# Patient Record
Sex: MALE | Race: WHITE | Employment: FULL TIME | URBAN - METROPOLITAN AREA
[De-identification: names, ages, dates, MRNs, and addresses within clinical notes are randomized per-mention and may not be internally consistent; named-entity substitution may affect disease eponyms.]

---

## 2018-08-28 PROBLEM — Z79.52 LONG TERM (CURRENT) USE OF SYSTEMIC STEROIDS: Status: ACTIVE | Noted: 2018-07-16

## 2018-08-28 PROBLEM — K58.9 IBS (IRRITABLE BOWEL SYNDROME): Status: ACTIVE | Noted: 2018-08-28

## 2018-09-10 PROBLEM — E29.1 HYPOGONADISM IN MALE: Status: ACTIVE | Noted: 2018-09-10

## 2021-03-29 ENCOUNTER — HOSPITAL ENCOUNTER (OUTPATIENT)
Dept: LAB | Age: 51
Discharge: HOME | End: 2021-03-29
Payer: OTHER GOVERNMENT

## 2021-03-29 ENCOUNTER — HOSPITAL ENCOUNTER (OUTPATIENT)
Dept: NON INVASIVE DIAGNOSTICS | Age: 51
Discharge: HOME | End: 2021-03-29
Payer: OTHER GOVERNMENT

## 2021-03-29 ENCOUNTER — TRANSCRIBE ORDER (OUTPATIENT)
Dept: REGISTRATION | Age: 51
End: 2021-03-29

## 2021-03-29 DIAGNOSIS — M23.203 DEGENERATIVE TEAR OF MEDIAL MENISCUS OF RIGHT KNEE: ICD-10-CM

## 2021-03-29 DIAGNOSIS — M23.203 DEGENERATIVE TEAR OF MEDIAL MENISCUS OF RIGHT KNEE: Primary | ICD-10-CM

## 2021-03-29 LAB
ALBUMIN SERPL-MCNC: 4 G/DL (ref 3.4–5)
ALBUMIN/GLOB SERPL: 1.5 {RATIO} (ref 0.8–1.7)
ALP SERPL-CCNC: 120 U/L (ref 45–117)
ALT SERPL-CCNC: 54 U/L (ref 16–61)
ANION GAP SERPL CALC-SCNC: 5 MMOL/L (ref 3–18)
AST SERPL-CCNC: 25 U/L (ref 10–38)
ATRIAL RATE: 72 BPM
BILIRUB SERPL-MCNC: 0.4 MG/DL (ref 0.2–1)
BUN SERPL-MCNC: 12 MG/DL (ref 7–18)
BUN/CREAT SERPL: 12 (ref 12–20)
CALCIUM SERPL-MCNC: 8.9 MG/DL (ref 8.5–10.1)
CALCULATED P AXIS, ECG09: 62 DEGREES
CALCULATED R AXIS, ECG10: -24 DEGREES
CALCULATED T AXIS, ECG11: 42 DEGREES
CHLORIDE SERPL-SCNC: 103 MMOL/L (ref 100–111)
CO2 SERPL-SCNC: 32 MMOL/L (ref 21–32)
CREAT SERPL-MCNC: 0.98 MG/DL (ref 0.6–1.3)
DIAGNOSIS, 93000: NORMAL
ERYTHROCYTE [DISTWIDTH] IN BLOOD BY AUTOMATED COUNT: 13.5 % (ref 11.6–14.5)
EST. AVERAGE GLUCOSE BLD GHB EST-MCNC: 134 MG/DL
GLOBULIN SER CALC-MCNC: 2.6 G/DL (ref 2–4)
GLUCOSE SERPL-MCNC: 112 MG/DL (ref 74–99)
HBA1C MFR BLD: 6.3 % (ref 4.2–5.6)
HCT VFR BLD AUTO: 40.9 % (ref 36–48)
HGB BLD-MCNC: 14.1 G/DL (ref 13–16)
MCH RBC QN AUTO: 34.3 PG (ref 24–34)
MCHC RBC AUTO-ENTMCNC: 34.5 G/DL (ref 31–37)
MCV RBC AUTO: 99.5 FL (ref 74–97)
P-R INTERVAL, ECG05: 198 MS
PLATELET # BLD AUTO: 285 K/UL (ref 135–420)
PMV BLD AUTO: 9.4 FL (ref 9.2–11.8)
POTASSIUM SERPL-SCNC: 3.9 MMOL/L (ref 3.5–5.5)
PROT SERPL-MCNC: 6.6 G/DL (ref 6.4–8.2)
Q-T INTERVAL, ECG07: 386 MS
QRS DURATION, ECG06: 110 MS
QTC CALCULATION (BEZET), ECG08: 422 MS
RBC # BLD AUTO: 4.11 M/UL (ref 4.7–5.5)
SODIUM SERPL-SCNC: 140 MMOL/L (ref 136–145)
VENTRICULAR RATE, ECG03: 72 BPM
WBC # BLD AUTO: 6.5 K/UL (ref 4.6–13.2)

## 2021-03-29 PROCEDURE — 85027 COMPLETE CBC AUTOMATED: CPT

## 2021-03-29 PROCEDURE — 83036 HEMOGLOBIN GLYCOSYLATED A1C: CPT

## 2021-03-29 PROCEDURE — 93005 ELECTROCARDIOGRAM TRACING: CPT

## 2021-03-29 PROCEDURE — 36415 COLL VENOUS BLD VENIPUNCTURE: CPT

## 2021-03-29 PROCEDURE — 80053 COMPREHEN METABOLIC PANEL: CPT

## 2021-08-04 ENCOUNTER — HOSPITAL ENCOUNTER (OUTPATIENT)
Dept: NON INVASIVE DIAGNOSTICS | Age: 51
Discharge: HOME | End: 2021-08-04
Payer: OTHER GOVERNMENT

## 2021-08-04 ENCOUNTER — TRANSCRIBE ORDER (OUTPATIENT)
Dept: REGISTRATION | Age: 51
End: 2021-08-04

## 2021-08-04 ENCOUNTER — HOSPITAL ENCOUNTER (OUTPATIENT)
Dept: LAB | Age: 51
Discharge: HOME | End: 2021-08-04
Payer: OTHER GOVERNMENT

## 2021-08-04 DIAGNOSIS — M23.203 DEGENERATIVE TEAR OF MEDIAL MENISCUS OF RIGHT KNEE: Primary | ICD-10-CM

## 2021-08-04 DIAGNOSIS — M23.203 DEGENERATIVE TEAR OF MEDIAL MENISCUS OF RIGHT KNEE: ICD-10-CM

## 2021-08-04 LAB
ALBUMIN SERPL-MCNC: 3.7 G/DL (ref 3.4–5)
ALBUMIN/GLOB SERPL: 1.4 {RATIO} (ref 0.8–1.7)
ALP SERPL-CCNC: 106 U/L (ref 45–117)
ALT SERPL-CCNC: 150 U/L (ref 16–61)
ANION GAP SERPL CALC-SCNC: 2 MMOL/L (ref 3–18)
AST SERPL-CCNC: 78 U/L (ref 10–38)
ATRIAL RATE: 65 BPM
BILIRUB SERPL-MCNC: 0.4 MG/DL (ref 0.2–1)
BUN SERPL-MCNC: 13 MG/DL (ref 7–18)
BUN/CREAT SERPL: 17 (ref 12–20)
CALCIUM SERPL-MCNC: 9.1 MG/DL (ref 8.5–10.1)
CALCULATED P AXIS, ECG09: 68 DEGREES
CALCULATED R AXIS, ECG10: -31 DEGREES
CALCULATED T AXIS, ECG11: 46 DEGREES
CHLORIDE SERPL-SCNC: 105 MMOL/L (ref 100–111)
CO2 SERPL-SCNC: 34 MMOL/L (ref 21–32)
CREAT SERPL-MCNC: 0.78 MG/DL (ref 0.6–1.3)
DIAGNOSIS, 93000: NORMAL
ERYTHROCYTE [DISTWIDTH] IN BLOOD BY AUTOMATED COUNT: 13.1 % (ref 11.6–14.5)
GLOBULIN SER CALC-MCNC: 2.7 G/DL (ref 2–4)
GLUCOSE SERPL-MCNC: 116 MG/DL (ref 74–99)
HCT VFR BLD AUTO: 41.4 % (ref 36–48)
HGB BLD-MCNC: 13.9 G/DL (ref 13–16)
MCH RBC QN AUTO: 33.1 PG (ref 24–34)
MCHC RBC AUTO-ENTMCNC: 33.6 G/DL (ref 31–37)
MCV RBC AUTO: 98.6 FL (ref 74–97)
P-R INTERVAL, ECG05: 188 MS
PLATELET # BLD AUTO: 220 K/UL (ref 135–420)
PMV BLD AUTO: 8.9 FL (ref 9.2–11.8)
POTASSIUM SERPL-SCNC: 3.8 MMOL/L (ref 3.5–5.5)
PROT SERPL-MCNC: 6.4 G/DL (ref 6.4–8.2)
Q-T INTERVAL, ECG07: 388 MS
QRS DURATION, ECG06: 108 MS
QTC CALCULATION (BEZET), ECG08: 403 MS
RBC # BLD AUTO: 4.2 M/UL (ref 4.35–5.65)
SODIUM SERPL-SCNC: 141 MMOL/L (ref 136–145)
VENTRICULAR RATE, ECG03: 65 BPM
WBC # BLD AUTO: 6.1 K/UL (ref 4.6–13.2)

## 2021-08-04 PROCEDURE — 80053 COMPREHEN METABOLIC PANEL: CPT

## 2021-08-04 PROCEDURE — 36415 COLL VENOUS BLD VENIPUNCTURE: CPT

## 2021-08-04 PROCEDURE — 93005 ELECTROCARDIOGRAM TRACING: CPT

## 2021-08-04 PROCEDURE — 85027 COMPLETE CBC AUTOMATED: CPT

## 2021-09-20 ENCOUNTER — APPOINTMENT (OUTPATIENT)
Dept: CT IMAGING | Age: 51
End: 2021-09-20
Attending: NURSE PRACTITIONER
Payer: COMMERCIAL

## 2021-09-20 ENCOUNTER — HOSPITAL ENCOUNTER (EMERGENCY)
Age: 51
Discharge: HOME | End: 2021-09-21
Attending: STUDENT IN AN ORGANIZED HEALTH CARE EDUCATION/TRAINING PROGRAM
Payer: COMMERCIAL

## 2021-09-20 VITALS
RESPIRATION RATE: 18 BRPM | OXYGEN SATURATION: 99 % | DIASTOLIC BLOOD PRESSURE: 103 MMHG | BODY MASS INDEX: 23.62 KG/M2 | HEIGHT: 70 IN | WEIGHT: 165 LBS | HEART RATE: 99 BPM | SYSTOLIC BLOOD PRESSURE: 117 MMHG | TEMPERATURE: 98.4 F

## 2021-09-20 DIAGNOSIS — S00.83XA CONTUSION OF FACE, INITIAL ENCOUNTER: Primary | ICD-10-CM

## 2021-09-20 PROCEDURE — 99282 EMERGENCY DEPT VISIT SF MDM: CPT

## 2021-09-20 PROCEDURE — 70486 CT MAXILLOFACIAL W/O DYE: CPT

## 2021-09-20 PROCEDURE — 70450 CT HEAD/BRAIN W/O DYE: CPT

## 2021-09-20 PROCEDURE — 74011250637 HC RX REV CODE- 250/637: Performed by: NURSE PRACTITIONER

## 2021-09-20 PROCEDURE — 90471 IMMUNIZATION ADMIN: CPT

## 2021-09-20 PROCEDURE — 74011250636 HC RX REV CODE- 250/636: Performed by: NURSE PRACTITIONER

## 2021-09-20 PROCEDURE — 90715 TDAP VACCINE 7 YRS/> IM: CPT | Performed by: NURSE PRACTITIONER

## 2021-09-20 PROCEDURE — 72125 CT NECK SPINE W/O DYE: CPT

## 2021-09-20 RX ORDER — ONDANSETRON 4 MG/1
4 TABLET, ORALLY DISINTEGRATING ORAL
Status: COMPLETED | OUTPATIENT
Start: 2021-09-20 | End: 2021-09-20

## 2021-09-20 RX ORDER — HYDROCODONE BITARTRATE AND ACETAMINOPHEN 5; 325 MG/1; MG/1
1 TABLET ORAL
Status: COMPLETED | OUTPATIENT
Start: 2021-09-20 | End: 2021-09-20

## 2021-09-20 RX ADMIN — TETANUS TOXOID, REDUCED DIPHTHERIA TOXOID AND ACELLULAR PERTUSSIS VACCINE, ADSORBED 0.5 ML: 5; 2.5; 8; 8; 2.5 SUSPENSION INTRAMUSCULAR at 23:49

## 2021-09-20 RX ADMIN — HYDROCODONE BITARTRATE AND ACETAMINOPHEN 1 TABLET: 5; 325 TABLET ORAL at 22:30

## 2021-09-20 RX ADMIN — ONDANSETRON 4 MG: 4 TABLET, ORALLY DISINTEGRATING ORAL at 22:30

## 2021-09-20 NOTE — ED PROVIDER NOTES
EMERGENCY DEPARTMENT HISTORY AND PHYSICAL EXAM    7:33 PM      Date: 9/20/2021  Patient Name: Lizbet Marcus    History of Presenting Illness     Chief Complaint   Patient presents with    Head Injury         History Provided By: Patient    Additional History (Context): Lizbet Marcus is a 46 y.o. male with past medical history significant for recent meniscus repair 9 days ago, prostatitis, arthritis, chronic bronchitis, GERD, hiatal hernia, hypertension, migraines, who presents with left maxillary facial trauma that he sustained at approximately 5:30 PM.  He was using a ride on lawn aerator when one of the handles that was weighted popped back and hit him in the face. He did lose consciousness but this was unwitnessed so he is unsure how long. He fell backward into the grass. He has a headache and is complaining of nausea. States he had blurred vision but that seems to be improved. He denies any vomiting. PCP: Gracie Oshea MD    Current Facility-Administered Medications   Medication Dose Route Frequency Provider Last Rate Last Admin    diph,Pertuss(AC),Tet Vac-PF (BOOSTRIX) suspension 0.5 mL  0.5 mL IntraMUSCular PRIOR TO DISCHARGE Cindy Ag, ALEXISP        ondansetron (ZOFRAN ODT) tablet 4 mg  4 mg Oral NOW Cheryal Reason Cindy Burgos, FNP        HYDROcodone-acetaminophen (NORCO) 5-325 mg per tablet 1 Tablet  1 Tablet Oral NOW OSMEL Valdes         Current Outpatient Medications   Medication Sig Dispense Refill    testosterone (Androderm) 4 mg/24 hr pt24 APPLY 1 PATCH TOPICALLY ONCE DAILY 90 Patch 5    amLODIPine (NORVASC) 2.5 mg tablet TAKE 1 TABLET BY MOUTH EVERY DAY      ciclopirox (LOPROX) 1 % sham SHAMPOO SCALP TWICE WEEKLY, ALLOW LATHER TO SIT FOR 5 MINUTES THEN RINSE.       clobetasoL (TEMOVATE) 0.05 % ointment APPLY TO THE AFFECTED AREAS OF LEGS HANDS AND FEET TWICE A DAY FOR 2 WEEKS THEN AS NEEDED      fluocinoNIDE (LIDEX) 0.05 % external solution APPLY TO AFFECTED AREAS OF SCALP NIGHTLY FOR 1 WEEK THEN ONLY AS NEEDED FOR ITCHING AND FLAKING.  methotrexate (RHEUMATREX) 2.5 mg tablet #10 pills one day each week, #5 in AM, #5 in PM      mometasone (ELOCON) 0.1 % topical cream APPLY TO DRY AREAS OF FACE TWICE DAILY FOR 2 WEEKS THEN ONLY AS NEEDED FOR DRY FLAKY AREAS RASH.  secukinumab (Cosentyx Pen) 150 mg/mL pnij RX 1: INJECT 150 MG UNDER THE SKIN (SUBCUTANEOUS INJECTION) WEEKLY FOR 5 DOSES; THEN 150 MG MONTHLY THEREAFTER      naloxone (NARCAN) 4 mg/actuation nasal spray Use 1 spray intranasally, then discard. Repeat with new spray every 2 min as needed for opioid overdose symptoms, alternating nostrils. 1 Each 0    furosemide (LASIX) 20 mg tablet TAKE 1 TABLET BY MOUTH EVERY DAY  1    HYDROcodone-acetaminophen (NORCO) 5-325 mg per tablet Take 1 Tab by mouth.  predniSONE (DELTASONE) 5 mg tablet TAKE 2 TABLETS (10 MG TOTAL) BY MOUTH DAILY.  traMADol (ULTRAM-ER) 200 mg tablet Take 200 mg by mouth daily.  diclofenac sodium 1.5 % drop by Apply Externally route.  aluminum & magnesium hydroxide-simethicone (MYLANTA II) 400-400-40 mg/5 mL suspension 30 mL.  diclofenac (VOLTAREN) 1 % gel 2 g.      DULoxetine (CYMBALTA) 30 mg capsule 30 mg.      folic acid (FOLVITE) 1 mg tablet Take 1 mg by mouth.  SELENIUM PO Take  by mouth.  tiZANidine (ZANAFLEX) 4 mg capsule Take 4 mg by mouth.  promethazine (PHENERGAN) 12.5 mg tablet Take  by mouth every six (6) hours as needed for Nausea.  butalbital-acetaminophen-caffeine (FIORICET) -40 mg per tablet Take 1 Tab by mouth every six (6) hours as needed for Pain. Indications: MIGRAINE      SUMAtriptan (IMITREX) 25 mg tablet Take 25 mg by mouth once as needed for Migraine.  omeprazole (PRILOSEC) 40 mg capsule Take 40 mg by mouth daily. Indications: GASTROESOPHAGEAL REFLUX      gabapentin (NEURONTIN) 400 mg capsule Take 400 mg by mouth three (3) times daily.  Indications: NEUROPATHIC PAIN      amitriptyline (ELAVIL) 75 mg tablet Take 25 mg by mouth nightly. Indications: NEUROPATHIC PAIN      atorvastatin (LIPITOR) 10 mg tablet Take 10 mg by mouth nightly. Indications: HYPERCHOLESTEROLEMIA      montelukast (SINGULAIR) 10 mg tablet Take 10 mg by mouth nightly. Indications: ALLERGIC RHINITIS      albuterol (ACCUNEB) 0.63 mg/3 mL nebulizer solution 0.63 mg by Nebulization route every six (6) hours as needed for Wheezing. Indications: ACUTE ASTHMA ATTACK      enalapril (VASOTEC) 2.5 mg tablet Take  by mouth nightly. Indications: HYPERTENSION      hyoscyamine SR (LEVBID) 0.375 mg SR tablet Take 375 mcg by mouth every twelve (12) hours. Indications: bladder dysfunction due to a nerve disorder         Past History     Past Medical History:  Past Medical History:   Diagnosis Date    Acute prostatitis     Acute upper GI bleed     Allergic rhinitis     Arthritis     osteo    Benign neoplasm of large bowel     Chronic bronchitis (HCC)     Diverticulosis of colon without hemorrhage     GERD (gastroesophageal reflux disease)     Hiatal hernia     Hypertension     Low blood sugar     Migraines     Pelvic pain in male     Peyronie's disease     Poison ivy 10/2020    Prostate nodule        Past Surgical History:  Past Surgical History:   Procedure Laterality Date    HX GI      several colonoscopies and endoscopies    HX ORTHOPAEDIC Right     ankle scope    HX UROLOGICAL      varicocele       Family History:  No family history on file. Social History:  Social History     Tobacco Use    Smoking status: Never Smoker    Smokeless tobacco: Never Used   Substance Use Topics    Alcohol use: No    Drug use: No       Allergies: Allergies   Allergen Reactions    Cephalexin Other (comments)     Other reaction(s): tore up skin on bottom of feet    Ciprofloxacin Other (comments)     TRAUMATIC PLANTAR FASCIITIS, TENDON RUPTURE.     Codeine Unknown (comments)     Other reaction(s): Severe Headache  Other reaction(s): other/intolerance  headaches    Other Medication Other (comments)     Antibiotic for diverticulitis casude problems with the tendon in the bottom of his foot-realized later in the conversation that it is ciprofloxacin    Sulfa (Sulfonamide Antibiotics) Unknown (comments)     Other reaction(s): unknown    Sulfasalazine Other (comments)     Other reaction(s): makes dizzy/room spinning         Review of Systems       Review of Systems   Constitutional: Negative. Negative for chills and fever. HENT: Negative for congestion, ear pain, nosebleeds and rhinorrhea. Eyes: Positive for visual disturbance. Negative for pain and redness. Respiratory: Negative. Negative for cough and shortness of breath. Cardiovascular: Negative. Negative for chest pain, palpitations and leg swelling. Gastrointestinal: Positive for nausea. Negative for abdominal pain, constipation, diarrhea and vomiting. Genitourinary: Negative. Negative for dysuria, frequency, hematuria and urgency. Musculoskeletal: Negative. Negative for back pain, gait problem, joint swelling and neck pain. Skin: Negative. Negative for rash and wound. Neurological: Positive for light-headedness and headaches. Negative for dizziness, seizures, speech difficulty and weakness. Hematological: Negative for adenopathy. Does not bruise/bleed easily. All other systems reviewed and are negative. Physical Exam     Visit Vitals  BP (!) 117/103 Comment: ROSE MARIE Lieberman made aware   Pulse 99   Temp 98.4 °F (36.9 °C)   Resp 18   Ht 5' 10\" (1.778 m)   Wt 74.8 kg (165 lb)   SpO2 99%   BMI 23.68 kg/m²         Physical Exam  Vitals and nursing note reviewed. Constitutional:       General: He is not in acute distress. Appearance: Normal appearance. He is normal weight. He is not ill-appearing, toxic-appearing or diaphoretic. HENT:      Head: Normocephalic. Comments: Ecchymosis to the left maxillary area.   Extraocular movement intact without pain. PERRLA. No septal hematoma, otorrhea, or rhinorrhea bilaterally. No nasal deformity. No facial bone crepitus. Eyes:      General: Vision grossly intact. Gaze aligned appropriately. Right eye: No discharge. Left eye: No discharge. Conjunctiva/sclera: Conjunctivae normal.      Pupils: Pupils are equal, round, and reactive to light. Cardiovascular:      Rate and Rhythm: Normal rate and regular rhythm. Pulmonary:      Effort: Pulmonary effort is normal. No respiratory distress. Breath sounds: Normal breath sounds. Abdominal:      General: Abdomen is flat. Palpations: Abdomen is soft. Tenderness: There is no abdominal tenderness. Musculoskeletal:         General: Normal range of motion. Cervical back: Normal, full passive range of motion without pain, normal range of motion and neck supple. Thoracic back: Normal.      Lumbar back: Normal.   Skin:     General: Skin is warm and dry. Capillary Refill: Capillary refill takes less than 2 seconds. Neurological:      General: No focal deficit present. Mental Status: He is alert and oriented to person, place, and time. Comments: Gait is steady and patient exhibits no evidence of ataxia. Patient is able to ambulate without difficulty. No focal neurological deficit noted. No facial droop, slurred speech, or evidence of altered mentation noted on exam.             Diagnostic Study Results     Labs -  No results found for this or any previous visit (from the past 12 hour(s)). Radiologic Studies -   CT MAXILLOFACIAL WO CONT    (Results Pending)   CT HEAD WO CONT    (Results Pending)   CT SPINE CERV WO CONT    (Results Pending)         Medical Decision Making   I am the first provider for this patient. I reviewed available nursing notes, past medical history, past surgical history, family history and social history.     Vital Signs-Reviewed the patient's vital signs. Records Reviewed: Nursing Notes and Old Medical Records (Time of Review: 7:33 PM)    Pulse Oximetry Analysis - 99% on RA- normal       ED Course: Progress Notes, Reevaluation, and Consults:  7:33 PM  Initial assessment performed. The patients presenting problems have been discussed, and they/their family are in agreement with the care plan formulated and outlined with them. I have encouraged them to ask questions as they arise throughout their visit. 9:05 PM : Pt care transferred to ROSE MARIE Lopez-ED provider. History of patient complaint(s), available diagnostic reports and current treatment plan has been discussed thoroughly. Bedside rounding on patient occured : no . Intended disposition of patient : TBD  Pending diagnostics reports and/or labs (please list): CT max face, cervical spine, head    Provider Notes (Medical Decision Making):     Differential diagnosis: Facial contusion, facial fracture, closed head injury/TBI/concussion, neck injury, intracranial injury    Patient is a 68-year-old male presents to the ER from home after he was hit in the left cheek with a weighted handle from an aerator. He states he did lose consciousness and since this was unwitnessed he is unsure for how long. He woke up in the grass by his dog licking his face. He has been complaining of headache, nausea, lightheadedness, and did have blurred vision at one point. He has had no vomiting. No neck pain, paresthesia, extremity weakness. Will obtain appropriate studies to evaluate patient's complaints and treat symptomatically. Will disposition after reassessment assuming no clinical change or worsening and appropriate response to symptomatic treatment. Diagnosis     Clinical Impression:   1.  Contusion of face, initial encounter        Disposition: pending       Follow-up Information    None          Current Discharge Medication List            Dictation disclaimer:  Please note that this dictation was completed with Dragon, the computer voice recognition software. Quite often unanticipated grammatical, syntax, homophones, and other interpretive errors are inadvertently transcribed by the computer software. Please disregard these errors. Please excuse any errors that have escaped final proofreading.

## 2021-09-21 NOTE — ED NOTES
2105  Assumed care of pt from [facility] NP at shift change. Plan: Awaiting CT scan. 0100  Discussed CT scans with pt and wife at bedside. CT scans negative for acute findings. Visual acuity 20/30 to each eye and 20/20 bilaterally. Will treat patient symptomatically. Tetanus updated in ED. At time of discharge, pt non-toxic appearing in NAD. Pt stable for prompt outpatient follow-up with PCP 1 to 2 days. Patient given strict instructions to return if symptoms worsen.

## 2022-03-11 ENCOUNTER — HOSPITAL ENCOUNTER (OUTPATIENT)
Dept: LAB | Age: 52
Discharge: HOME | End: 2022-03-11
Payer: OTHER GOVERNMENT

## 2022-03-11 ENCOUNTER — TRANSCRIBE ORDER (OUTPATIENT)
Dept: REGISTRATION | Age: 52
End: 2022-03-11

## 2022-03-11 ENCOUNTER — HOSPITAL ENCOUNTER (OUTPATIENT)
Dept: NON INVASIVE DIAGNOSTICS | Age: 52
Discharge: HOME | End: 2022-03-11
Payer: OTHER GOVERNMENT

## 2022-03-11 DIAGNOSIS — M17.11 OSTEOARTHRITIS OF RIGHT KNEE: ICD-10-CM

## 2022-03-11 DIAGNOSIS — M17.11 OSTEOARTHRITIS OF RIGHT KNEE: Primary | ICD-10-CM

## 2022-03-11 LAB
APPEARANCE UR: CLEAR
ATRIAL RATE: 92 BPM
BILIRUB UR QL: NEGATIVE
CALCULATED P AXIS, ECG09: 65 DEGREES
CALCULATED R AXIS, ECG10: -43 DEGREES
CALCULATED T AXIS, ECG11: 57 DEGREES
COLOR UR: YELLOW
DIAGNOSIS, 93000: NORMAL
EST. AVERAGE GLUCOSE BLD GHB EST-MCNC: 166 MG/DL
GLUCOSE UR STRIP.AUTO-MCNC: NEGATIVE MG/DL
HBA1C MFR BLD: 7.4 % (ref 4.2–5.6)
HGB UR QL STRIP: NEGATIVE
KETONES UR QL STRIP.AUTO: NEGATIVE MG/DL
LEUKOCYTE ESTERASE UR QL STRIP.AUTO: NEGATIVE
NITRITE UR QL STRIP.AUTO: NEGATIVE
P-R INTERVAL, ECG05: 198 MS
PH UR STRIP: 6.5 [PH] (ref 5–8)
PROT UR STRIP-MCNC: NEGATIVE MG/DL
Q-T INTERVAL, ECG07: 350 MS
QRS DURATION, ECG06: 94 MS
QTC CALCULATION (BEZET), ECG08: 432 MS
SP GR UR REFRACTOMETRY: 1.01 (ref 1–1.03)
UROBILINOGEN UR QL STRIP.AUTO: 0.2 EU/DL (ref 0.2–1)
VENTRICULAR RATE, ECG03: 92 BPM

## 2022-03-11 PROCEDURE — 36415 COLL VENOUS BLD VENIPUNCTURE: CPT

## 2022-03-11 PROCEDURE — 81003 URINALYSIS AUTO W/O SCOPE: CPT

## 2022-03-11 PROCEDURE — 87086 URINE CULTURE/COLONY COUNT: CPT

## 2022-03-11 PROCEDURE — 83036 HEMOGLOBIN GLYCOSYLATED A1C: CPT

## 2022-03-11 PROCEDURE — 93005 ELECTROCARDIOGRAM TRACING: CPT

## 2022-03-12 LAB
BACTERIA SPEC CULT: NORMAL
SERVICE CMNT-IMP: NORMAL
SERVICE CMNT-IMP: NORMAL

## 2022-03-18 PROBLEM — Z79.52 LONG TERM (CURRENT) USE OF SYSTEMIC STEROIDS: Status: ACTIVE | Noted: 2018-07-16

## 2022-03-20 PROBLEM — K58.9 IBS (IRRITABLE BOWEL SYNDROME): Status: ACTIVE | Noted: 2018-08-28

## 2022-03-20 PROBLEM — E29.1 HYPOGONADISM IN MALE: Status: ACTIVE | Noted: 2018-09-10

## 2022-03-30 ENCOUNTER — HOSPITAL ENCOUNTER (OUTPATIENT)
Dept: PREADMISSION TESTING | Age: 52
Discharge: HOME | End: 2022-03-30
Payer: COMMERCIAL

## 2022-03-30 PROCEDURE — U0003 INFECTIOUS AGENT DETECTION BY NUCLEIC ACID (DNA OR RNA); SEVERE ACUTE RESPIRATORY SYNDROME CORONAVIRUS 2 (SARS-COV-2) (CORONAVIRUS DISEASE [COVID-19]), AMPLIFIED PROBE TECHNIQUE, MAKING USE OF HIGH THROUGHPUT TECHNOLOGIES AS DESCRIBED BY CMS-2020-01-R: HCPCS

## 2022-03-31 ENCOUNTER — HOSPITAL ENCOUNTER (OUTPATIENT)
Dept: PREADMISSION TESTING | Age: 52
Discharge: HOME | End: 2022-03-31

## 2022-03-31 VITALS — BODY MASS INDEX: 25.2 KG/M2 | HEIGHT: 71 IN | WEIGHT: 180 LBS

## 2022-03-31 LAB — SARS-COV-2, NAA: NOT DETECTED

## 2022-03-31 RX ORDER — METOPROLOL SUCCINATE 25 MG/1
25 TABLET, EXTENDED RELEASE ORAL DAILY
COMMUNITY

## 2022-03-31 RX ORDER — HYDROCODONE BITARTRATE AND IBUPROFEN 5; 200 MG/1; MG/1
1 TABLET ORAL
COMMUNITY
End: 2022-04-06

## 2022-03-31 RX ORDER — METFORMIN HYDROCHLORIDE 500 MG/1
850 TABLET ORAL 2 TIMES DAILY WITH MEALS
COMMUNITY

## 2022-03-31 RX ORDER — CEFAZOLIN SODIUM/WATER 2 G/20 ML
2 SYRINGE (ML) INTRAVENOUS ONCE
Status: CANCELLED | OUTPATIENT
Start: 2022-03-31 | End: 2022-03-31

## 2022-03-31 RX ORDER — CEPHALEXIN 500 MG/1
500 CAPSULE ORAL 2 TIMES DAILY
COMMUNITY
Start: 2022-02-22 | End: 2022-04-06

## 2022-03-31 RX ORDER — SODIUM CHLORIDE, SODIUM LACTATE, POTASSIUM CHLORIDE, CALCIUM CHLORIDE 600; 310; 30; 20 MG/100ML; MG/100ML; MG/100ML; MG/100ML
125 INJECTION, SOLUTION INTRAVENOUS CONTINUOUS
Status: CANCELLED | OUTPATIENT
Start: 2022-03-31

## 2022-03-31 NOTE — PERIOP NOTES
PAT - SURGICAL PRE-ADMISSION INSTRUCTIONS    NAME:  Tom [place]                                                          TODAY'S DATE:  3/31/2022    SURGERY DATE:  4/6/2022                                  SURGERY ARRIVAL TIME:   TBA    1. Do NOT eat or drink anything, including candy or gum, after MIDNIGHT on 4/6/2022 , unless you have specific instructions from your Surgeon or Anesthesia Provider to do so. 2. No smoking 24 hours before surgery. 3. No alcohol 24 hours prior to the day of surgery. 4. No recreational drugs for one week prior to the day of surgery. 5. Leave all valuables, including money/purse, at home. 6. Remove all jewelry, nail polish, makeup (including mascara); no lotions, powders, deodorant, or perfume/cologne/after shave. 7. Glasses/Contact lenses and Dentures may be worn to the hospital.  They will be removed prior to surgery. 8. Call your doctor if symptoms of a cold or illness develop within 24 ours prior to surgery. 9. AN ADULT MUST DRIVE YOU HOME AFTER OUTPATIENT SURGERY. 10. If you are having an OUTPATIENT procedure, please make arrangements for a responsible adult to be with you for 24 hours after your surgery. 11. If you are admitted to the hospital, you will be assigned to a bed after surgery is complete. Normally a family member will not be able to see you until you are in your assigned bed. 12. Visitation Restrictions Explained. Special Instructions:  Covid Test done 3/30/2022 with negative result Patient not vaccinated, Quarantine requirements discussed  No Advanced Directive or DNR  Bring CPAP., Take these medications the morning of surgery with a sip of water:  amlodipine, atorvastation, HOLD oral diabetic medication on the MORNING OF surgery. , HOLD metformin/glucophage dose starting the EVENING BEFORE the day of surgery.     Patient Prep:    use skin prep cloths with CHG     These surgical instructions were reviewed with patient during the PAT phone call

## 2022-04-05 PROBLEM — G47.30 SLEEP APNEA: Chronic | Status: ACTIVE | Noted: 2022-04-05

## 2022-04-05 PROBLEM — E11.9 DIABETES MELLITUS TYPE 2, CONTROLLED (HCC): Status: ACTIVE | Noted: 2022-04-05

## 2022-04-05 PROBLEM — E78.00 HIGH CHOLESTEROL: Chronic | Status: ACTIVE | Noted: 2022-04-05

## 2022-04-05 PROBLEM — E11.9 DIABETES MELLITUS TYPE 2, CONTROLLED (HCC): Chronic | Status: ACTIVE | Noted: 2022-04-05

## 2022-04-05 PROBLEM — I10 HTN (HYPERTENSION): Chronic | Status: ACTIVE | Noted: 2022-04-05

## 2022-04-05 PROBLEM — M17.11 PRIMARY OSTEOARTHRITIS OF RIGHT KNEE: Status: ACTIVE | Noted: 2022-04-05

## 2022-04-05 PROBLEM — G47.30 SLEEP APNEA: Status: ACTIVE | Noted: 2022-04-05

## 2022-04-05 PROBLEM — E78.00 HIGH CHOLESTEROL: Status: ACTIVE | Noted: 2022-04-05

## 2022-04-05 PROBLEM — I10 HTN (HYPERTENSION): Status: ACTIVE | Noted: 2022-04-05

## 2022-04-05 PROBLEM — M17.11 PRIMARY OSTEOARTHRITIS OF RIGHT KNEE: Chronic | Status: ACTIVE | Noted: 2022-04-05

## 2022-04-05 RX ORDER — SODIUM CHLORIDE 0.9 % (FLUSH) 0.9 %
5-40 SYRINGE (ML) INJECTION AS NEEDED
Status: CANCELLED | OUTPATIENT
Start: 2022-04-05

## 2022-04-05 RX ORDER — SODIUM CHLORIDE 0.9 % (FLUSH) 0.9 %
5-40 SYRINGE (ML) INJECTION EVERY 8 HOURS
Status: CANCELLED | OUTPATIENT
Start: 2022-04-05

## 2022-04-05 RX ORDER — SODIUM CHLORIDE, SODIUM LACTATE, POTASSIUM CHLORIDE, CALCIUM CHLORIDE 600; 310; 30; 20 MG/100ML; MG/100ML; MG/100ML; MG/100ML
125 INJECTION, SOLUTION INTRAVENOUS CONTINUOUS
Status: CANCELLED | OUTPATIENT
Start: 2022-04-05 | End: 2022-04-06

## 2022-04-05 NOTE — H&P
History and Physical        Patient: Naomi Marion               Sex: male          DOA: (Not on file)         YOB: 1970      Age:  45 y.o.        LOS:  LOS: 0 days        HPI:     Ssuannah Head is a 49-year-old male who has been seen for right knee revision arthroscopy/subtotal medial meniscectomy with grade 4 trochlear, grade 3 medial and patellar changes from August 2021, similar arthritic change from previous arthroscopy/status post Workers' Compensation injury at [facility] on 02/11/21. The MRI shows advancing compartmental osteoarthritis on the medial side. The anterior compartment seems to be stable. There are no meniscal tears. Standing AP, tunnel, lateral, and sunrise views of the right knee were obtained and interpreted in the office and reveal moderate medial tibiofemoral DJD, Kellgren Sergio changes, grade 3. Past Medical History:   Diagnosis Date    Acute prostatitis     Acute upper GI bleed     Allergic rhinitis     Arthritis     osteo    Benign neoplasm of large bowel     Chronic bronchitis (HCC)     Chronic pain     Diabetes (HCC)     Diverticulosis of colon without hemorrhage     GERD (gastroesophageal reflux disease)     Hiatal hernia     Hypertension     Low blood sugar     Migraines     Pelvic pain in male     Peyronie's disease     Poison ivy 10/2020    Prostate nodule     Sleep apnea     cpap       Past Surgical History:   Procedure Laterality Date    HX GI      several colonoscopies and endoscopies    HX ORTHOPAEDIC Right     ankle scope    HX ORTHOPAEDIC Right     knee x3    HX ORTHOPAEDIC Bilateral     menicus     HX UROLOGICAL      varicocele       No family history on file.     Social History     Socioeconomic History    Marital status:    Tobacco Use    Smoking status: Never Smoker    Smokeless tobacco: Never Used   Vaping Use    Vaping Use: Never used   Substance and Sexual Activity    Alcohol use: No    Drug use: No    Sexual activity: Yes       Prior to Admission medications    Medication Sig Start Date End Date Taking? Authorizing Provider   metFORMIN (GLUCOPHAGE) 500 mg tablet Take  by mouth two (2) times daily (with meals). Provider, Historical   metoprolol succinate (TOPROL-XL) 25 mg XL tablet Take 25 mg by mouth daily. Provider, Historical   HYDROcodone-ibuprofen (VICOPROFEN) 5-200 mg per tablet Take 1 Tablet by mouth. Provider, Historical   cephALEXin (KEFLEX) 500 mg capsule Take 500 mg by mouth two (2) times a day. 2/22/22   Provider, Historical   testosterone (Androderm) 4 mg/24 hr pt24 APPLY 1 PATCH TOPICALLY ONCE DAILY 12/23/20   Lew Sumner MD   amLODIPine (NORVASC) 2.5 mg tablet TAKE 1 TABLET BY MOUTH EVERY DAY 10/12/20   Provider, Historical   ciclopirox (LOPROX) 1 % sham SHAMPOO SCALP TWICE WEEKLY, ALLOW LATHER TO SIT FOR 5 MINUTES THEN RINSE. 10/13/20   Provider, Historical   clobetasoL (TEMOVATE) 0.05 % ointment APPLY TO THE AFFECTED AREAS OF LEGS HANDS AND FEET TWICE A DAY FOR 2 WEEKS THEN AS NEEDED 10/13/20   Provider, Historical   fluocinoNIDE (LIDEX) 0.05 % external solution APPLY TO AFFECTED AREAS OF SCALP NIGHTLY FOR 1 WEEK THEN ONLY AS NEEDED FOR ITCHING AND FLAKING. 10/14/20   Provider, Historical   methotrexate (RHEUMATREX) 2.5 mg tablet #10 pills one day each week, #5 in AM, #5 in PM 7/28/20   Provider, Historical   mometasone (ELOCON) 0.1 % topical cream APPLY TO DRY AREAS OF FACE TWICE DAILY FOR 2 WEEKS THEN ONLY AS NEEDED FOR DRY FLAKY AREAS RASH. 10/13/20   Provider, Historical   secukinumab (Cosentyx Pen) 150 mg/mL pnij 150 mL every Monday and Friday. 5/5/20   Provider, Historical   naloxone (NARCAN) 4 mg/actuation nasal spray Use 1 spray intranasally, then discard. Repeat with new spray every 2 min as needed for opioid overdose symptoms, alternating nostrils.  11/24/19   Lew Sumner MD   furosemide (LASIX) 20 mg tablet TAKE 1 TABLET BY MOUTH EVERY DAY 6/12/19 Provider, Historical   HYDROcodone-acetaminophen (NORCO) 5-325 mg per tablet Take 1 Tab by mouth. 10/7/16   Provider, Historical   predniSONE (DELTASONE) 5 mg tablet TAKE 2 TABLETS (10 MG TOTAL) BY MOUTH DAILY. 6/10/19   Provider, Historical   traMADol (ULTRAM-ER) 200 mg tablet Take 200 mg by mouth daily. Provider, Historical   diclofenac sodium 1.5 % drop by Apply Externally route. Provider, Historical   aluminum & magnesium hydroxide-simethicone (MYLANTA II) 400-400-40 mg/5 mL suspension 30 mL. 2/20/18   Provider, Historical   diclofenac (VOLTAREN) 1 % gel 2 g. 4/16/18   Provider, Historical   DULoxetine (CYMBALTA) 30 mg capsule 30 mg. 4/10/18   Provider, Historical   folic acid (FOLVITE) 1 mg tablet Take 1 mg by mouth. 1/15/18   Provider, Historical   SELENIUM PO Take  by mouth. Provider, Historical   tiZANidine (ZANAFLEX) 4 mg capsule Take 4 mg by mouth. Provider, Historical   promethazine (PHENERGAN) 12.5 mg tablet Take  by mouth every six (6) hours as needed for Nausea. Provider, Historical   butalbital-acetaminophen-caffeine (FIORICET) -40 mg per tablet Take 1 Tab by mouth every six (6) hours as needed for Pain. Indications: MIGRAINE    Provider, Historical   SUMAtriptan (IMITREX) 25 mg tablet Take 25 mg by mouth once as needed for Migraine. Provider, Historical   omeprazole (PRILOSEC) 40 mg capsule Take 40 mg by mouth daily. Indications: GASTROESOPHAGEAL REFLUX    Provider, Historical   gabapentin (NEURONTIN) 400 mg capsule Take 400 mg by mouth three (3) times daily. Indications: NEUROPATHIC PAIN    Provider, Historical   amitriptyline (ELAVIL) 75 mg tablet Take 25 mg by mouth nightly. Indications: NEUROPATHIC PAIN    Provider, Historical   atorvastatin (LIPITOR) 10 mg tablet Take 10 mg by mouth nightly. Indications: HYPERCHOLESTEROLEMIA    Provider, Historical   montelukast (SINGULAIR) 10 mg tablet Take 10 mg by mouth nightly.  Indications: ALLERGIC RHINITIS    Provider, Historical albuterol (ACCUNEB) 0.63 mg/3 mL nebulizer solution 0.63 mg by Nebulization route every six (6) hours as needed for Wheezing. Indications: ACUTE ASTHMA ATTACK    Provider, Historical   enalapril (VASOTEC) 2.5 mg tablet Take  by mouth nightly. Indications: HYPERTENSION    Provider, Historical   hyoscyamine SR (LEVBID) 0.375 mg SR tablet Take 375 mcg by mouth every twelve (12) hours. Indications: bladder dysfunction due to a nerve disorder    Provider, Historical       Allergies   Allergen Reactions    Crab Anaphylaxis    Cephalexin Other (comments)     Other reaction(s): tore up skin on bottom of feet    Ciprofloxacin Other (comments)     TRAUMATIC PLANTAR FASCIITIS, TENDON RUPTURE.  Codeine Unknown (comments)     Other reaction(s): Severe Headache  Other reaction(s): other/intolerance  headaches    Other Medication Other (comments)     Antibiotic for diverticulitis casude problems with the tendon in the bottom of his foot-realized later in the conversation that it is ciprofloxacin    Sulfa (Sulfonamide Antibiotics) Unknown (comments)     Other reaction(s): unknown    Sulfasalazine Other (comments)     Other reaction(s): makes dizzy/room spinning       Review of Systems  GENERAL:  Patient has no signs of fever, chills or weight change. HEAD/ENTM:  Patient has no signs of headaches, dizziness, hearing loss, ringing in ears, sore throat/hoarseness, recent cold, double vision, blurred vision, itchy eyes, eye redness or eye discharge. NEUROLOGIC:  Patient has no signs of fainting, muscle weakness, numbness/tingling, loss of balance or seizure disorder. CARDIOVASCULAR:  Patient has no signs of chest pain, palpitations, rheumatic fever or heart murmur. RESPIRATORY:  Patient has no signs of chronic cough, wheezing, difficulty breathing, pain on breathing or shortness of breath.   GASTROINTESTINAL:  Patient has no signs of nausea/vomiting, difficulty swallowing, gas/bloating, indigestion, abdominal pain, diarrhea, bloody stools or hemorrhoids. GENITOURINARY:  Patient has no signs of blood in urine, painful urinating, burning sensation, bladder/kidney infection, frequent urinating or incontinence. MUSCULOSKELETAL: Patient presents with joint stiffness and joint pain. Patient has no signs of fracture/dislocation, sprain/strain, tendonitis, rheumatoid disease, gout or swelling of feet. INTEGUMENTARY:  Patient has no signs of rash/itching, psoriasis, Raynaud's phenomenon or varicose veins. EMOTIONAL:  Patient has no signs of anxiety, depression, bipolar disorder, memory loss or change in mood. Physical Exam:      There were no vitals taken for this visit. Physical Exam:   His right knee has no knee effusion. His incision is clean and dry. He has good extension today and flexes to beyond 90 degrees. His tenderness is in the medial joint line. He has positive patellofemoral crepitation. Assessment/Plan     Principal Problem:    Primary osteoarthritis of right knee (4/5/2022)    Active Problems:    ILD (interstitial lung disease) ([facility] [address] (10/11/2016)      Allergic rhinitis (10/24/2008)      IBS (irritable bowel syndrome) (8/28/2018)      Overview: Overview:       POSS - PER GI      Psoriasis (10/25/2016)      Diabetes mellitus type 2, controlled ([address] (4/5/2022)      High cholesterol (4/5/2022)      HTN (hypertension) (4/5/2022)      Sleep apnea (4/5/2022)    The risks associated with right outpatient total knee arthroplasty using the subvastus approach were reviewed and all questions were answered. The benefits include earlier ambulation, better mobility, and quicker return of muscle function. The risks including pain, bleeding, infection, DVT, need for future surgeries amongst others are reviewed and questions are answered. The patient is going to be scheduled for outpatient TKA using the [employer] knee replacement system.   We have given the patient Keflex for antibiotic prophylaxis and also a prescription for Roxicodone for postoperative pain management. We will use aspirin 81mg twice daily for DVT prophylaxis. The patient will also receive an IV dose of antibiotics preoperatively. The patient will be discharged the same day to home health physical therapy. The patient was counseled on the importance of ice, elevation, muscle stretching and range of motion exercises. The patient will follow up two weeks postoperatively. Review of x-rays show Kellgren-Sergio grade 3/4 changes. At this point, we are going to keep him in the same light duty status as Dr. Yesika Tucker does not think it is going to change with no deep squats, no ladders. Sedentary work is permitted with transportation to and from the job site.

## 2022-04-06 ENCOUNTER — ANESTHESIA EVENT (OUTPATIENT)
Dept: SURGERY | Age: 52
End: 2022-04-06
Payer: OTHER GOVERNMENT

## 2022-04-06 ENCOUNTER — HOSPITAL ENCOUNTER (OUTPATIENT)
Age: 52
Setting detail: OUTPATIENT SURGERY
Discharge: HOME HEALTH | End: 2022-04-06
Attending: ORTHOPAEDIC SURGERY | Admitting: ORTHOPAEDIC SURGERY
Payer: OTHER GOVERNMENT

## 2022-04-06 ENCOUNTER — ANESTHESIA (OUTPATIENT)
Dept: SURGERY | Age: 52
End: 2022-04-06
Payer: OTHER GOVERNMENT

## 2022-04-06 VITALS
OXYGEN SATURATION: 98 % | BODY MASS INDEX: 25.34 KG/M2 | TEMPERATURE: 97.3 F | RESPIRATION RATE: 16 BRPM | WEIGHT: 181 LBS | HEIGHT: 71 IN | DIASTOLIC BLOOD PRESSURE: 76 MMHG | HEART RATE: 78 BPM | SYSTOLIC BLOOD PRESSURE: 123 MMHG

## 2022-04-06 LAB
ABO + RH BLD: NORMAL
BLOOD GROUP ANTIBODIES SERPL: NORMAL
GLUCOSE BLD STRIP.AUTO-MCNC: 150 MG/DL (ref 70–110)
GLUCOSE BLD STRIP.AUTO-MCNC: 167 MG/DL (ref 70–110)
SPECIMEN EXP DATE BLD: NORMAL

## 2022-04-06 PROCEDURE — 82962 GLUCOSE BLOOD TEST: CPT

## 2022-04-06 PROCEDURE — 77030031139 HC SUT VCRL2 J&J -A: Performed by: ORTHOPAEDIC SURGERY

## 2022-04-06 PROCEDURE — 76010000153 HC OR TIME 1.5 TO 2 HR: Performed by: ORTHOPAEDIC SURGERY

## 2022-04-06 PROCEDURE — 74011636637 HC RX REV CODE- 636/637: Performed by: ANESTHESIOLOGY

## 2022-04-06 PROCEDURE — 77030038010: Performed by: ORTHOPAEDIC SURGERY

## 2022-04-06 PROCEDURE — L1830 KO IMMOB CANVAS LONG PRE OTS: HCPCS | Performed by: ORTHOPAEDIC SURGERY

## 2022-04-06 PROCEDURE — 74011000272 HC RX REV CODE- 272: Performed by: ORTHOPAEDIC SURGERY

## 2022-04-06 PROCEDURE — 97116 GAIT TRAINING THERAPY: CPT

## 2022-04-06 PROCEDURE — 76210000026 HC REC RM PH II 1 TO 1.5 HR: Performed by: ORTHOPAEDIC SURGERY

## 2022-04-06 PROCEDURE — 77030033263 HC DRSG MEPILEX 16-48IN BORD MOLN -B: Performed by: ORTHOPAEDIC SURGERY

## 2022-04-06 PROCEDURE — 77030034479 HC ADH SKN CLSR PRINEO J&J -B: Performed by: ORTHOPAEDIC SURGERY

## 2022-04-06 PROCEDURE — 74011000250 HC RX REV CODE- 250: Performed by: ORTHOPAEDIC SURGERY

## 2022-04-06 PROCEDURE — 97166 OT EVAL MOD COMPLEX 45 MIN: CPT

## 2022-04-06 PROCEDURE — 77030040361 HC SLV COMPR DVT MDII -B: Performed by: ORTHOPAEDIC SURGERY

## 2022-04-06 PROCEDURE — 77030020782 HC GWN BAIR PAWS FLX 3M -B: Performed by: ORTHOPAEDIC SURGERY

## 2022-04-06 PROCEDURE — 76942 ECHO GUIDE FOR BIOPSY: CPT | Performed by: ORTHOPAEDIC SURGERY

## 2022-04-06 PROCEDURE — 74011250636 HC RX REV CODE- 250/636: Performed by: ANESTHESIOLOGY

## 2022-04-06 PROCEDURE — 74011250636 HC RX REV CODE- 250/636: Performed by: PHYSICIAN ASSISTANT

## 2022-04-06 PROCEDURE — 74011000258 HC RX REV CODE- 258: Performed by: ORTHOPAEDIC SURGERY

## 2022-04-06 PROCEDURE — C1713 ANCHOR/SCREW BN/BN,TIS/BN: HCPCS | Performed by: ORTHOPAEDIC SURGERY

## 2022-04-06 PROCEDURE — 74011250636 HC RX REV CODE- 250/636: Performed by: REGISTERED NURSE

## 2022-04-06 PROCEDURE — C1776 JOINT DEVICE (IMPLANTABLE): HCPCS | Performed by: ORTHOPAEDIC SURGERY

## 2022-04-06 PROCEDURE — 97161 PT EVAL LOW COMPLEX 20 MIN: CPT

## 2022-04-06 PROCEDURE — 74011250636 HC RX REV CODE- 250/636: Performed by: ORTHOPAEDIC SURGERY

## 2022-04-06 PROCEDURE — 74011000250 HC RX REV CODE- 250: Performed by: REGISTERED NURSE

## 2022-04-06 PROCEDURE — 77030034694 HC SCPL CANADY PLSM DISP USMD -E: Performed by: ORTHOPAEDIC SURGERY

## 2022-04-06 PROCEDURE — 76210000016 HC OR PH I REC 1 TO 1.5 HR: Performed by: ORTHOPAEDIC SURGERY

## 2022-04-06 PROCEDURE — 77030003666 HC NDL SPINAL BD -A: Performed by: ORTHOPAEDIC SURGERY

## 2022-04-06 PROCEDURE — 36415 COLL VENOUS BLD VENIPUNCTURE: CPT

## 2022-04-06 PROCEDURE — 97535 SELF CARE MNGMENT TRAINING: CPT

## 2022-04-06 PROCEDURE — 77030011628: Performed by: ORTHOPAEDIC SURGERY

## 2022-04-06 PROCEDURE — 77030013079 HC BLNKT BAIR HGGR 3M -A: Performed by: ANESTHESIOLOGY

## 2022-04-06 PROCEDURE — 64450 NJX AA&/STRD OTHER PN/BRANCH: CPT | Performed by: ANESTHESIOLOGY

## 2022-04-06 PROCEDURE — 74011000250 HC RX REV CODE- 250: Performed by: ANESTHESIOLOGY

## 2022-04-06 PROCEDURE — 76060000034 HC ANESTHESIA 1.5 TO 2 HR: Performed by: ORTHOPAEDIC SURGERY

## 2022-04-06 PROCEDURE — 74011250637 HC RX REV CODE- 250/637: Performed by: ANESTHESIOLOGY

## 2022-04-06 PROCEDURE — 86900 BLOOD TYPING SEROLOGIC ABO: CPT

## 2022-04-06 PROCEDURE — 2709999900 HC NON-CHARGEABLE SUPPLY: Performed by: ORTHOPAEDIC SURGERY

## 2022-04-06 PROCEDURE — 77030013708 HC HNDPC SUC IRR PULS STRY –B: Performed by: ORTHOPAEDIC SURGERY

## 2022-04-06 PROCEDURE — 77030016060 HC NDL NRV BLK TELE -A: Performed by: ANESTHESIOLOGY

## 2022-04-06 PROCEDURE — 77030012508 HC MSK AIRWY LMA AMBU -A: Performed by: ANESTHESIOLOGY

## 2022-04-06 PROCEDURE — 77030027138 HC INCENT SPIROMETER -A: Performed by: ORTHOPAEDIC SURGERY

## 2022-04-06 PROCEDURE — 77030002934 HC SUT MCRYL J&J -B: Performed by: ORTHOPAEDIC SURGERY

## 2022-04-06 PROCEDURE — 74011250637 HC RX REV CODE- 250/637: Performed by: PHYSICIAN ASSISTANT

## 2022-04-06 DEVICE — IMPLANTABLE DEVICE: Type: IMPLANTABLE DEVICE | Site: KNEE | Status: FUNCTIONAL

## 2022-04-06 DEVICE — BASE TIB SZ 7 CEM PKT ATTUNE RP: Type: IMPLANTABLE DEVICE | Site: KNEE | Status: FUNCTIONAL

## 2022-04-06 DEVICE — CEMENT BNE 40GM FULL DOSE PMMA W/O ANTIBIO HI VISC N RADPQ: Type: IMPLANTABLE DEVICE | Site: KNEE | Status: FUNCTIONAL

## 2022-04-06 DEVICE — COMPONENT PAT DIA38MM KNEE POLY CEM MEDIALIZED ANAT ATTUNE: Type: IMPLANTABLE DEVICE | Site: KNEE | Status: FUNCTIONAL

## 2022-04-06 DEVICE — KNEE K1 TOT HEMI STD CEM IMPL CAPPED SYNTHES: Type: IMPLANTABLE DEVICE | Site: KNEE | Status: FUNCTIONAL

## 2022-04-06 RX ORDER — DEXMEDETOMIDINE HYDROCHLORIDE 100 UG/ML
INJECTION, SOLUTION INTRAVENOUS AS NEEDED
Status: DISCONTINUED | OUTPATIENT
Start: 2022-04-06 | End: 2022-04-06 | Stop reason: HOSPADM

## 2022-04-06 RX ORDER — FENTANYL CITRATE 50 UG/ML
25 INJECTION, SOLUTION INTRAMUSCULAR; INTRAVENOUS AS NEEDED
Status: DISCONTINUED | OUTPATIENT
Start: 2022-04-06 | End: 2022-04-06 | Stop reason: HOSPADM

## 2022-04-06 RX ORDER — GUAIFENESIN 100 MG/5ML
81 LIQUID (ML) ORAL 2 TIMES DAILY
Qty: 42 TABLET | Refills: 0 | Status: SHIPPED
Start: 2022-04-06 | End: 2022-09-07

## 2022-04-06 RX ORDER — LIDOCAINE HYDROCHLORIDE 20 MG/ML
INJECTION, SOLUTION EPIDURAL; INFILTRATION; INTRACAUDAL; PERINEURAL AS NEEDED
Status: DISCONTINUED | OUTPATIENT
Start: 2022-04-06 | End: 2022-04-06 | Stop reason: HOSPADM

## 2022-04-06 RX ORDER — KETAMINE HYDROCHLORIDE 10 MG/ML
INJECTION, SOLUTION INTRAMUSCULAR; INTRAVENOUS AS NEEDED
Status: DISCONTINUED | OUTPATIENT
Start: 2022-04-06 | End: 2022-04-06 | Stop reason: HOSPADM

## 2022-04-06 RX ORDER — PANTOPRAZOLE SODIUM 40 MG/1
40 TABLET, DELAYED RELEASE ORAL ONCE
Status: COMPLETED | OUTPATIENT
Start: 2022-04-06 | End: 2022-04-06

## 2022-04-06 RX ORDER — CEPHALEXIN 500 MG/1
500 CAPSULE ORAL 4 TIMES DAILY
Qty: 4 CAPSULE | Refills: 0 | Status: SHIPPED
Start: 2022-04-06 | End: 2022-04-07

## 2022-04-06 RX ORDER — DEXAMETHASONE SODIUM PHOSPHATE 4 MG/ML
4 INJECTION, SOLUTION INTRA-ARTICULAR; INTRALESIONAL; INTRAMUSCULAR; INTRAVENOUS; SOFT TISSUE ONCE
Status: COMPLETED | OUTPATIENT
Start: 2022-04-06 | End: 2022-04-06

## 2022-04-06 RX ORDER — SODIUM CHLORIDE 0.9 % (FLUSH) 0.9 %
5-40 SYRINGE (ML) INJECTION EVERY 8 HOURS
Status: DISCONTINUED | OUTPATIENT
Start: 2022-04-06 | End: 2022-04-06 | Stop reason: HOSPADM

## 2022-04-06 RX ORDER — DEXAMETHASONE SODIUM PHOSPHATE 4 MG/ML
INJECTION, SOLUTION INTRA-ARTICULAR; INTRALESIONAL; INTRAMUSCULAR; INTRAVENOUS; SOFT TISSUE AS NEEDED
Status: DISCONTINUED | OUTPATIENT
Start: 2022-04-06 | End: 2022-04-06 | Stop reason: HOSPADM

## 2022-04-06 RX ORDER — GLYCOPYRROLATE 0.2 MG/ML
INJECTION INTRAMUSCULAR; INTRAVENOUS AS NEEDED
Status: DISCONTINUED | OUTPATIENT
Start: 2022-04-06 | End: 2022-04-06 | Stop reason: HOSPADM

## 2022-04-06 RX ORDER — ONDANSETRON 2 MG/ML
4 INJECTION INTRAMUSCULAR; INTRAVENOUS ONCE
Status: DISCONTINUED | OUTPATIENT
Start: 2022-04-06 | End: 2022-04-06 | Stop reason: HOSPADM

## 2022-04-06 RX ORDER — SODIUM CHLORIDE, SODIUM LACTATE, POTASSIUM CHLORIDE, CALCIUM CHLORIDE 600; 310; 30; 20 MG/100ML; MG/100ML; MG/100ML; MG/100ML
100 INJECTION, SOLUTION INTRAVENOUS CONTINUOUS
Status: DISCONTINUED | OUTPATIENT
Start: 2022-04-06 | End: 2022-04-06 | Stop reason: HOSPADM

## 2022-04-06 RX ORDER — TRANEXAMIC ACID 650 1/1
1950 TABLET ORAL ONCE
Status: COMPLETED | OUTPATIENT
Start: 2022-04-06 | End: 2022-04-06

## 2022-04-06 RX ORDER — METOPROLOL SUCCINATE 25 MG/1
25 TABLET, EXTENDED RELEASE ORAL DAILY
Status: DISCONTINUED | OUTPATIENT
Start: 2022-04-06 | End: 2022-04-06 | Stop reason: HOSPADM

## 2022-04-06 RX ORDER — ONDANSETRON 2 MG/ML
INJECTION INTRAMUSCULAR; INTRAVENOUS AS NEEDED
Status: DISCONTINUED | OUTPATIENT
Start: 2022-04-06 | End: 2022-04-06 | Stop reason: HOSPADM

## 2022-04-06 RX ORDER — FENTANYL CITRATE 50 UG/ML
50 INJECTION, SOLUTION INTRAMUSCULAR; INTRAVENOUS
Status: DISCONTINUED | OUTPATIENT
Start: 2022-04-06 | End: 2022-04-06 | Stop reason: HOSPADM

## 2022-04-06 RX ORDER — ROPIVACAINE HYDROCHLORIDE 5 MG/ML
INJECTION, SOLUTION EPIDURAL; INFILTRATION; PERINEURAL AS NEEDED
Status: DISCONTINUED | OUTPATIENT
Start: 2022-04-06 | End: 2022-04-06 | Stop reason: HOSPADM

## 2022-04-06 RX ORDER — ACETAMINOPHEN 500 MG
1000 TABLET ORAL ONCE
Status: COMPLETED | OUTPATIENT
Start: 2022-04-06 | End: 2022-04-06

## 2022-04-06 RX ORDER — MIDAZOLAM HYDROCHLORIDE 1 MG/ML
INJECTION, SOLUTION INTRAMUSCULAR; INTRAVENOUS AS NEEDED
Status: DISCONTINUED | OUTPATIENT
Start: 2022-04-06 | End: 2022-04-06 | Stop reason: HOSPADM

## 2022-04-06 RX ORDER — DEXTROSE MONOHYDRATE 100 MG/ML
0-250 INJECTION, SOLUTION INTRAVENOUS AS NEEDED
Status: DISCONTINUED | OUTPATIENT
Start: 2022-04-06 | End: 2022-04-06 | Stop reason: HOSPADM

## 2022-04-06 RX ORDER — NALOXONE HYDROCHLORIDE 0.4 MG/ML
0.1 INJECTION, SOLUTION INTRAMUSCULAR; INTRAVENOUS; SUBCUTANEOUS AS NEEDED
Status: DISCONTINUED | OUTPATIENT
Start: 2022-04-06 | End: 2022-04-06 | Stop reason: HOSPADM

## 2022-04-06 RX ORDER — METOCLOPRAMIDE HYDROCHLORIDE 5 MG/ML
INJECTION INTRAMUSCULAR; INTRAVENOUS AS NEEDED
Status: DISCONTINUED | OUTPATIENT
Start: 2022-04-06 | End: 2022-04-06 | Stop reason: HOSPADM

## 2022-04-06 RX ORDER — MAGNESIUM SULFATE 100 %
4 CRYSTALS MISCELLANEOUS AS NEEDED
Status: DISCONTINUED | OUTPATIENT
Start: 2022-04-06 | End: 2022-04-06 | Stop reason: HOSPADM

## 2022-04-06 RX ORDER — CEFAZOLIN SODIUM/WATER 2 G/20 ML
2 SYRINGE (ML) INTRAVENOUS ONCE
Status: COMPLETED | OUTPATIENT
Start: 2022-04-06 | End: 2022-04-06

## 2022-04-06 RX ORDER — OXYCODONE HYDROCHLORIDE 5 MG/1
5 TABLET ORAL
Status: DISCONTINUED | OUTPATIENT
Start: 2022-04-06 | End: 2022-04-06 | Stop reason: HOSPADM

## 2022-04-06 RX ORDER — INSULIN LISPRO 100 [IU]/ML
INJECTION, SOLUTION INTRAVENOUS; SUBCUTANEOUS ONCE
Status: COMPLETED | OUTPATIENT
Start: 2022-04-06 | End: 2022-04-06

## 2022-04-06 RX ORDER — ASPIRIN 81 MG/1
81 TABLET ORAL DAILY
COMMUNITY
End: 2022-04-06

## 2022-04-06 RX ORDER — SODIUM CHLORIDE 0.9 % (FLUSH) 0.9 %
5-40 SYRINGE (ML) INJECTION AS NEEDED
Status: DISCONTINUED | OUTPATIENT
Start: 2022-04-06 | End: 2022-04-06 | Stop reason: HOSPADM

## 2022-04-06 RX ORDER — SODIUM CHLORIDE, SODIUM LACTATE, POTASSIUM CHLORIDE, CALCIUM CHLORIDE 600; 310; 30; 20 MG/100ML; MG/100ML; MG/100ML; MG/100ML
125 INJECTION, SOLUTION INTRAVENOUS CONTINUOUS
Status: DISCONTINUED | OUTPATIENT
Start: 2022-04-06 | End: 2022-04-06 | Stop reason: HOSPADM

## 2022-04-06 RX ORDER — PROPOFOL 10 MG/ML
INJECTION, EMULSION INTRAVENOUS AS NEEDED
Status: DISCONTINUED | OUTPATIENT
Start: 2022-04-06 | End: 2022-04-06 | Stop reason: HOSPADM

## 2022-04-06 RX ORDER — CEFAZOLIN SODIUM/WATER 2 G/20 ML
2 SYRINGE (ML) INTRAVENOUS ONCE
Status: DISCONTINUED | OUTPATIENT
Start: 2022-04-06 | End: 2022-04-06 | Stop reason: SDUPTHER

## 2022-04-06 RX ADMIN — SODIUM CHLORIDE, SODIUM LACTATE, POTASSIUM CHLORIDE, AND CALCIUM CHLORIDE 1000 ML: 600; 310; 30; 20 INJECTION, SOLUTION INTRAVENOUS at 07:59

## 2022-04-06 RX ADMIN — TRANEXAMIC ACID 1950 MG: 650 TABLET ORAL at 07:38

## 2022-04-06 RX ADMIN — ROPIVACAINE HYDROCHLORIDE 20 ML: 5 INJECTION, SOLUTION EPIDURAL; INFILTRATION; PERINEURAL at 08:47

## 2022-04-06 RX ADMIN — LIDOCAINE HYDROCHLORIDE 100 MG: 20 INJECTION, SOLUTION INTRAVENOUS at 10:11

## 2022-04-06 RX ADMIN — GLYCOPYRROLATE 0.2 MG: 0.2 INJECTION INTRAMUSCULAR; INTRAVENOUS at 10:11

## 2022-04-06 RX ADMIN — INSULIN LISPRO 2 UNITS: 100 INJECTION, SOLUTION INTRAVENOUS; SUBCUTANEOUS at 08:22

## 2022-04-06 RX ADMIN — METOCLOPRAMIDE 10 MG: 5 INJECTION, SOLUTION INTRAMUSCULAR; INTRAVENOUS at 10:19

## 2022-04-06 RX ADMIN — SODIUM CHLORIDE, SODIUM LACTATE, POTASSIUM CHLORIDE, AND CALCIUM CHLORIDE: 600; 310; 30; 20 INJECTION, SOLUTION INTRAVENOUS at 10:32

## 2022-04-06 RX ADMIN — KETAMINE HYDROCHLORIDE 20 MG: 10 INJECTION, SOLUTION INTRAMUSCULAR; INTRAVENOUS at 10:23

## 2022-04-06 RX ADMIN — PROPOFOL 200 MG: 10 INJECTION, EMULSION INTRAVENOUS at 10:12

## 2022-04-06 RX ADMIN — DEXMEDETOMIDINE HYDROCHLORIDE 20 MCG: 100 INJECTION, SOLUTION INTRAVENOUS at 08:47

## 2022-04-06 RX ADMIN — KETAMINE HYDROCHLORIDE 30 MG: 10 INJECTION, SOLUTION INTRAMUSCULAR; INTRAVENOUS at 10:11

## 2022-04-06 RX ADMIN — INSULIN LISPRO 3 UNITS: 100 INJECTION, SOLUTION INTRAVENOUS; SUBCUTANEOUS at 12:06

## 2022-04-06 RX ADMIN — MIDAZOLAM 2 MG: 1 INJECTION INTRAMUSCULAR; INTRAVENOUS at 10:09

## 2022-04-06 RX ADMIN — DEXAMETHASONE SODIUM PHOSPHATE 4 MG: 4 INJECTION, SOLUTION INTRAMUSCULAR; INTRAVENOUS at 08:00

## 2022-04-06 RX ADMIN — PANTOPRAZOLE SODIUM 40 MG: 40 TABLET, DELAYED RELEASE ORAL at 07:39

## 2022-04-06 RX ADMIN — FENTANYL CITRATE 25 MCG: 50 INJECTION, SOLUTION INTRAMUSCULAR; INTRAVENOUS at 12:39

## 2022-04-06 RX ADMIN — DEXAMETHASONE SODIUM PHOSPHATE 4 MG: 4 INJECTION, SOLUTION INTRAMUSCULAR; INTRAVENOUS at 08:47

## 2022-04-06 RX ADMIN — FENTANYL CITRATE 25 MCG: 50 INJECTION, SOLUTION INTRAMUSCULAR; INTRAVENOUS at 12:24

## 2022-04-06 RX ADMIN — FENTANYL CITRATE 25 MCG: 50 INJECTION, SOLUTION INTRAMUSCULAR; INTRAVENOUS at 12:13

## 2022-04-06 RX ADMIN — ACETAMINOPHEN 1000 MG: 500 TABLET ORAL at 07:39

## 2022-04-06 RX ADMIN — ONDANSETRON HYDROCHLORIDE 8 MG: 2 INJECTION INTRAMUSCULAR; INTRAVENOUS at 10:22

## 2022-04-06 RX ADMIN — Medication 2 G: at 10:16

## 2022-04-06 RX ADMIN — MIDAZOLAM 2 MG: 1 INJECTION INTRAMUSCULAR; INTRAVENOUS at 08:44

## 2022-04-06 RX ADMIN — OXYCODONE 5 MG: 5 TABLET ORAL at 13:30

## 2022-04-06 RX ADMIN — SODIUM CHLORIDE, SODIUM LACTATE, POTASSIUM CHLORIDE, AND CALCIUM CHLORIDE 125 ML/HR: 600; 310; 30; 20 INJECTION, SOLUTION INTRAVENOUS at 07:59

## 2022-04-06 NOTE — ANESTHESIA POSTPROCEDURE EVALUATION
Post-Anesthesia Evaluation and Assessment    Cardiovascular Function/Vital Signs  Visit Vitals  /76   Pulse 78   Temp 36.3 °C (97.3 °F)   Resp 16   Ht 5' 10.5\" (1.791 m)   Wt 82.1 kg (181 lb)   SpO2 98%   BMI 25.60 kg/m²       Patient is status post Procedure(s):  RIGHT TOTAL KNEE ARTHROPLASTY. Nausea/Vomiting: Controlled. Postoperative hydration reviewed and adequate. Pain:  Pain Scale 1: FLACC (04/06/22 1400)  Pain Intensity 1: 2 (04/06/22 1400)   Managed. Neurological Status:   Neuro (WDL): Within Defined Limits (04/06/22 1400)   At baseline. Mental Status and Level of Consciousness: Arousable. Pulmonary Status:   O2 Device: None (Room air) (04/06/22 1400)   Adequate oxygenation and airway patent. Complications related to anesthesia: None    Patient has met all discharge requirements.     Signed By: Sharon Ruelas MD    April 6, 2022

## 2022-04-06 NOTE — PROGRESS NOTES
Problem: Self Care Deficits Care Plan (Adult)  Goal: *Acute Goals and Plan of Care (Insert Text)  Description: Initial Occupational Therapy Goals (4/6/2022) Within 7 day(s):    1. Patient will perform grooming standing sinkside with supervision for increased independence with ADLs. 2. Patient will perform LB dressing with supervision & A/E PRN for increased independence with ADLs. 3. Patient will perform toilet transfer with supervision for increased independence with ADLs. 4. Patient will perform all aspects of toileting with supervision for increased independence with ADLs. 5. Patient will independently apply energy conservation techniques with 1 verbal cue(s)for increased independence with ADLs. 6. Patient will perform bathroom mobility with supervision for increased independence/safety with ADLs. Outcome: Progressing Towards Goal  OCCUPATIONAL THERAPY EVALUATION    Patient: Ana Solano [de-identified]45 y.o. male)  Date: 4/6/2022  Primary Diagnosis: RIGHT KNEE OSTEOARTHRITIS  Procedure(s) (LRB):  RIGHT TOTAL KNEE ARTHROPLASTY (Right) Day of Surgery   Precautions: Fall,WBAT  PLOF: pt mod I for ADLs/functional mobility    ASSESSMENT AND RECOMMENDATIONS:  Based on the objective data described below, the patient presents with RLE decreased ROM and strength affecting LE ADLs. Pt found seated in recliner chair, vitals assessed and WNL, pt reporting pain 8/10, agreeable to therapy. Educated pt on proper body mechanics for ADLs s/p TKR. Pt completed upper body dressing with supervision seated in chair. Pt able to thread B feet through underwear/pants with min A, and CGA when standing to pull up to waist. Pt required min A to complete sock/shoe donning on R. Pt CGA/SBA for STS/bathroom mobility with vc for safe use of RW. Pt voided standing at toilet with SBA for clothing management. Pt ambulated back to recliner, ice applied to R knee. Spouse present during session for education on home safety.  Provided opportunity for pt to voice questions on ADL performance when home, pt has no further concerns. Patient will benefit from skilled Occupational Therapy intervention to maximize safety/independence with ADLs at d/c.    Education: Reviewed home safety, body mechanics, importance of moving every hour to prevent joint stiffness, role of ice for edema/pain control, Rolling Walker management/safety, and adaptive dressing techniques with patient verbalizing  understanding at this time     Patient will benefit from skilled intervention to address the above impairments. Patient's rehabilitation potential is considered to be Good  Factors which may influence rehabilitation potential include:   []             None noted  []             Mental ability/status  []             Medical condition  []             Home/family situation and support systems  []             Safety awareness  [x]             Pain tolerance/management  []             Other:        PLAN :  Recommendations and Planned Interventions:   [x]               Self Care Training                  [x]      Therapeutic Activities  [x]               Functional Mobility Training   []      Cognitive Retraining  []               Therapeutic Exercises           []      Endurance Activities  []               Balance Training                    []      Neuromuscular Re-Education  []               Visual/Perceptual Training     [x]      Home Safety Training  [x]               Patient Education                   [x]      Family Training/Education  []               Other (comment):    Frequency/Duration: Patient will be followed by Occupational Therapy 1-2 times per day/4-7 days per week to address goals. Discharge Recommendations: Home health with adult supervision at least 24 hours after d/c  Further Equipment Recommendations for Discharge: N/A     SUBJECTIVE:   Patient stated it hurts.     OBJECTIVE DATA SUMMARY:     Past Medical History:   Diagnosis Date    Acute prostatitis     Acute upper GI bleed     Allergic rhinitis     Arthritis     osteo    Benign neoplasm of large bowel     Chronic bronchitis (HCC)     Chronic pain     Diabetes ([facility] [address]     Diverticulosis of colon without hemorrhage     GERD (gastroesophageal reflux disease)     Hiatal hernia     Hypertension     Low blood sugar     Migraines     Pelvic pain in male     Peyronie's disease     Poison ivy 10/2020    Prostate nodule     Sleep apnea     cpap     Past Surgical History:   Procedure Laterality Date    HX GI      several colonoscopies and endoscopies    HX ORTHOPAEDIC Right     ankle scope    HX ORTHOPAEDIC Right     knee x3    HX ORTHOPAEDIC Bilateral     menicus     HX UROLOGICAL      varicocele     Barriers to Learning/Limitations: yes;  physical and other (post-anesthesia)  Compensate with: visual, verbal, tactile, kinesthetic cues/model    Home Situation/Prior Level of Function:   Home Situation  Home Environment: Private residence  # Steps to Enter: 3  Rails to Enter: Yes  Hand Rails : Bilateral (wide)  One/Two Story Residence: Two story, live on 1st floor  Lift Chair Available: No  Living Alone: No  Support Systems: Spouse/Significant Other  Patient Expects to be Discharged to[de-identified] Home with home health  Current DME Used/Available at Home: Walker, rolling,Tub transfer bench,Raised toilet seat,Walker, rollator  Tub or Shower Type: Tub/Shower combination  []  Right hand dominant   []  Left hand dominant    Cognitive/Behavioral Status:  Neurologic State: Alert  Orientation Level: Oriented to person;Oriented to place;Oriented to situation  Cognition: Follows commands  Safety/Judgement: Awareness of environment    Skin: R knee incision w/ Mepilex   Edema: compression hose in place & applied ice     Coordination: BUE  Coordination: Within functional limits  Fine Motor Skills-Upper: Left Intact; Right Intact    Gross Motor Skills-Upper: Left Intact; Right Intact    Balance:  Sitting: Intact  Standing: Intact; With support    Strength: BUE  Strength: Generally decreased, functional    Tone & Sensation:BUE  Tone: Normal  Sensation: Impaired (R knee)     Range of Motion: BUE  AROM: Generally decreased, functional    Functional Mobility and Transfers for ADLs:  Bed Mobility:  Scooting: Stand-by assistance  Transfers:  Sit to Stand: Contact guard assistance;Stand-by assistance (vc)   Bathroom Mobility: Contact guard assistance;Stand-by assistance (vc)    ADL Assessment:  Feeding: Independent  Oral Facial Hygiene/Grooming: Stand-by assistance  Bathing: Contact guard assistance  Upper Body Dressing: Supervision  Lower Body Dressing: Minimum assistance  Toileting: Stand by assistance    ADL Intervention:  Upper Body Dressing Assistance  Dressing Assistance: Supervision  Pullover Shirt: Supervision    Lower Body Dressing Assistance  Dressing Assistance: Minimum assistance  Underpants: Minimum assistance  Pants With Elastic Waist: Minimum assistance  Shoes with Cloth Laces: Minimum assistance  Leg Crossed Method Used: No  Position Performed: Seated in chair  Cues: Verbal cues provided;Visual cues provided    Toileting  Toileting Assistance: Stand-by assistance  Bladder Hygiene: Stand-by assistance  Clothing Management: Stand-by assistance    Cognitive Retraining  Safety/Judgement: Awareness of environment    Pain:  Pain level pre-treatment: 8/10  Pain level post-treatment: 8/10  Pain Intervention(s): Medication administer by Nursing (see MAR); Rest, Ice, Repositioning   Response to intervention: Nurse notified, see doc flow     Activity Tolerance:   Fair. Patient able to stand ~5 minute(s). Patient able to complete ADLs with intermittent rest breaks. Patient limited by pain, strength, ROM. Patient unsteady. Please refer to the flowsheet for vital signs taken during this treatment.   After treatment:   [x]  Patient left in no apparent distress sitting up in chair  []  Patient sitting on EOB  []  Patient left in no apparent distress in bed  [x]  Call bell left within reach  [x]  Nursing notified  [x]  Caregiver present  [x]  Ice applied  []  SCD's on while back in bed  [] Bed alarm activated    COMMUNICATION/EDUCATION:   Communication/Collaboration:  [x]       Role of Occupational Therapy in the acute care setting. [x]      Home safety education was provided and the patient/caregiver indicated understanding. [x]      Patient/family have participated as able in goal setting and plan of care. [x]      Patient/family agree to work toward stated goals and plan of care. []      Patient understands intent and goals of therapy, but is neutral about his/her participation. []      Patient is unable to participate in plan of care at this time. Thank you for this referral.  Sagrario Oliva, OTR/L  Time Calculation: 33 mins    Eval Complexity: History: MEDIUM Complexity : Expanded review of history including physical, cognitive and psychosocial  history ; Examination: MEDIUM Complexity : 3-5 performance deficits relating to physical, cognitive , or psychosocial skils that result in activity limitations and / or participation restrictions; Decision Making:MEDIUM Complexity : Patient may present with comorbidities that affect occupational performnce.  Miniml to moderate modification of tasks or assistance (eg, physical or verbal ) with assesment(s) is necessary to enable patient to complete evaluation

## 2022-04-06 NOTE — ANESTHESIA PROCEDURE NOTES
R ACB    Start time: 2022 8:44 AM  End time: 2022 8:47 AM  Performed by: Lincoln Lucero MD  Authorized by: Lincoln Lucero MD       Pre-procedure: Indications: at surgeon's request and post-op pain management    Preanesthetic Checklist: risks and benefits discussed, site marked and timeout performed      Block Type:   Block Type:   Adductor canal block  Laterality:  Right  Monitoring:  Standard ASA monitoring, continuous pulse ox, frequent vital sign checks, heart rate, responsive to questions and oxygen  Injection Technique:  Single shot  Procedures: ultrasound guided    Patient Position: supine  Prep: chlorhexidine    Needle Type:  Stimuplex  Needle Gauge:  21 G  Needle Localization:  Anatomical landmarks and ultrasound guidance    Assessment:  Number of attempts:  1  Injection Assessment:  Incremental injection every 5 mL, local visualized surrounding nerve on ultrasound, negative aspiration for blood, no paresthesia, no intravascular symptoms and ultrasound image on chart  Patient tolerance:  Patient tolerated the procedure well with no immediate complications  Ana Chaudhary   = [id number]  CSN = [id number]

## 2022-04-06 NOTE — ANESTHESIA PREPROCEDURE EVALUATION
Relevant Problems   RESPIRATORY SYSTEM   (+) Sleep apnea      CARDIOVASCULAR   (+) HTN (hypertension)      ENDOCRINE   (+) Diabetes mellitus type 2, controlled (HCC)   (+) Psoriasis with arthropathy (HCC)       Anesthetic History   No history of anesthetic complications            Review of Systems / Medical History  Patient summary reviewed, nursing notes reviewed and pertinent labs reviewed    Pulmonary        Sleep apnea: CPAP           Neuro/Psych   Within defined limits           Cardiovascular    Hypertension              Exercise tolerance: >4 METS     GI/Hepatic/Renal     GERD           Endo/Other    Diabetes    Arthritis     Other Findings              Physical Exam    Airway  Mallampati: III  TM Distance: 4 - 6 cm  Neck ROM: decreased range of motion   Mouth opening: Diminished (comment)     Cardiovascular  Regular rate and rhythm,  S1 and S2 normal,  no murmur, click, rub, or gallop  Rhythm: regular  Rate: normal         Dental  No notable dental hx       Pulmonary  Breath sounds clear to auscultation               Abdominal  GI exam deferred       Other Findings            Anesthetic Plan    ASA: 3  Anesthesia type: general      Post-op pain plan if not by surgeon: peripheral nerve block single    Induction: Intravenous  Anesthetic plan and risks discussed with: Patient      GA vs SAB discussed. Pt prefers GA    ACB explained. Risks explained including bleeding, infection, nerve injury, failed block. Procedure explained as elective. Pt understands and desires to proceed.

## 2022-04-06 NOTE — DISCHARGE INSTRUCTIONS
Deborah Monsivais III, MD Reesa Cass, PA-C    Lower Extremity Surgery  Discharge Instructions      Please take the time to review the following instructions before you leave the hospital and use them as guidelines during your recovery from surgery. If you have any questions you may contact my office at [phone]. In the event of an emergency please call [phone] or have someone take you to the nearest emergency room. Wound Care/Dressing Changes:    [x]   You may change your dressing as needed. Beginning the 2 days after you are discharged from the hospital you can change your dressing daily. A big, bulky dressing isn't necessary as long as there isn't any drainage from the incisions. There will be a piece of mesh tape over your incision that resembles gauze. This tape should stay on and you should not attempt to remove it. Showering/Bathing:    [x]     You may shower 2 days after surgery. Your dressing may be removed for showering. Do not soak your incision underwater. Weight Bearing Status/Braces/Activity:    You are weight bearing as tolerated on the operative extremity. Ice/Elevation:    Continue ice and elevation consistently for 48 hours after surgery. Medication:    1. You will be given a prescription for pain medications. This should have been given at your preop appointment. Refills of pain medication are authorized during office hours only (8AM - 5PM Mon thru Fri). 2. You can take 1000 mg of Tylenol every 8 hours. Do not exceed 3000 mg of Tylenol per day. If you have been given Norco for post operative pain, do not take the Tylenol. 3. You should use Aspirin 81 mg twice daily for 21 days from the date of your surgery. This will help to prevent blood clots from forming in your legs. This should be started at dinner the day of your surgery.   If you are taking another medication such as Xarelto this should also be started the day after the surgery. 4. You may resume the medications you were taking prior to your surgery, unless otherwise specified in your discharge instructions. 5. You are to take an oral antibiotic when you get home from the surgery until the prescription is done. This should have been provided at the pre-operative appointment. This antibiotic is generally Keflex or Clindamycin. Follow Up Appointment:    If you are unsure of your follow-up appointment date and time, please call [phone]. Please let our  know you are scheduling a post-op appointment. Most appointments should be between 7-14 days after your surgery. Physical Therapy:    [x]   You are to participate with Home Health Physical Therapy as arranged pre-operatively in the convenience of your own home. Important signs and symptoms:    If any of the following signs and symptoms occurs, you should contact Dr. America Barber' office. Please be advised if a problem arises which you feel required immediate medical attention or your are unable to contact Dr. America Barber' office you should seek immediate medical attention. Signs and symptoms to watch for include:  1. A sudden increase in swelling and/or redness or warmth at the area your surgery was performed which isn't relieved by rest, ice and elevation. 2. Oral temperature greater than 101.5 degrees for 12 hours or more which isn't relieved by an increase in fluid intake and taking two Tylenol every 4-6 hours. Do not exceed 3000 mg of Tylenol per day. 3. Excessive drainage from your incisions or drainage that hasn't stopped by 72 hours. 4. Calf pain, tenderness, redness or swelling which isn't relieved with rest and elevation. 5. Fever, chills, shortness of breath, chest pain, nausea, vomiting or other signs and symptoms which are of concern to you.           DISCHARGE SUMMARY from Nurse    PATIENT INSTRUCTIONS:    After general anesthesia or intravenous sedation, for 24 hours or while taking prescription Narcotics:  · Limit your activities  · Do not drive and operate hazardous machinery  · Do not make important personal or business decisions  · Do  not drink alcoholic beverages  · If you have not urinated within 8 hours after discharge, please contact your surgeon on call. Report the following to your surgeon:  · Excessive pain, swelling, redness or odor of or around the surgical area  · Temperature over 100.5  · Nausea and vomiting lasting longer than 4 hours or if unable to take medications  · Any signs of decreased circulation or nerve impairment to extremity: change in color, persistent  numbness, tingling, coldness or increase pain  · Any questions    What to do at Home:  Recommended activity: Ambulate in house and No lifting, Driving, or Strenuous exercise until advised,     If you experience any of the following symptoms above, please follow up with Dr. Alta Hernandez. *  Please give a list of your current medications to your Primary Care Provider. *  Please update this list whenever your medications are discontinued, doses are      changed, or new medications (including over-the-counter products) are added. *  Please carry medication information at all times in case of emergency situations. These are general instructions for a healthy lifestyle:    No smoking/ No tobacco products/ Avoid exposure to second hand smoke  Surgeon General's Warning:  Quitting smoking now greatly reduces serious risk to your health. Obesity, smoking, and sedentary lifestyle greatly increases your risk for illness    A healthy diet, regular physical exercise & weight monitoring are important for maintaining a healthy lifestyle    You may be retaining fluid if you have a history of heart failure or if you experience any of the following symptoms:  Weight gain of 3 pounds or more overnight or 5 pounds in a week, increased swelling in our hands or feet or shortness of breath while lying flat in bed.   Please call your doctor as soon as you notice any of these symptoms; do not wait until your next office visit. Patient armband removed and shredded    The discharge information has been reviewed with the patient and caregiver. The patient and caregiver verbalized understanding. Discharge medications reviewed with the patient and caregiver and appropriate educational materials and side effects teaching were provided.   ___________________________________________________________________________________________________________________________________

## 2022-04-06 NOTE — INTERVAL H&P NOTE
Update History & Physical    The Patient's History and Physical of April 5,   The surgery was reviewed with the patient and I examined the patient. There was no change. The surgical site was confirmed by the patient and me. Plan:  The risk, benefits, expected outcome, and alternative to the recommended procedure have been discussed with the patient. Patient understands and wants to proceed with the procedure.     Electronically signed by Leslie Martins MD on 4/6/2022 at 8:47 AM

## 2022-04-06 NOTE — PROGRESS NOTES
Problem: Mobility Impaired (Adult and Pediatric)  Goal: *Acute Goals and Plan of Care (Insert Text)  Description: In one day:  Pt will ambulate >50' CGA with RW to demonstrate functional ability to ambulate within the home. Pt will ascend/descend 1 step with bilateral UE support for improved ability to enter/exit the home. Pt will transfer sit <> stand CGA for improved ability to transfer independently and lower extremity strength. Note: [x]  Patient has met MD tod morales for d/c home   [x]  Recommend HH with 24 hour adult care   []  Benefit from additional acute PT session to address:      PHYSICAL THERAPY EVALUATION    Patient: Hamilton Larson [de-identified]45 y.o. male)  Date: 4/6/2022  Primary Diagnosis: RIGHT KNEE OSTEOARTHRITIS  Procedure(s) (LRB):  RIGHT TOTAL KNEE ARTHROPLASTY (Right) Day of Surgery   Precautions:   Fall,WBAT  WBAT  PLOF: independent    ASSESSMENT :  Based on the objective data described below, the patient presents with decreased R LE ROM and strength, decreased gait speed, and pain following R TKA. Pt was seated in recliner reporting 8/10 pain when PT arrived for the session. Pt transferred sit<>stand CGA with verbal cueing for hand placement. Pt ambulated 150' with RW CGA with decreased gait speed and foot clearance. Pt ascended/descended 1 step x2 CGA with verbal cueing for foot placement. First attempt at stair training, pt used bilateral UE support GCA. Second attempt pt ascended/descended the step CGA with both UEs using a support on the R to simulate home environment. Pt was educated on proper ice usage, sleep positioning, and placement of pillows under the calf and heel instead of under the knee. Pt was left in recliner at the end of the session reporting no increase in pain, nurse was notified of end of session and positioning of pt. PT recommends discharge with home health physical therapy to address the impairments listed above.       Patient will benefit from skilled intervention to address the above impairments. Patient's rehabilitation potential is considered to be Good  Factors which may influence rehabilitation potential include:   []         None noted  []         Mental ability/status  []         Medical condition  []         Home/family situation and support systems  []         Safety awareness  [x]         Pain tolerance/management  []         Other:      PLAN :  Recommendations and Planned Interventions:   []           Bed Mobility Training             []    Neuromuscular Re-Education  [x]           Transfer Training                   []    Orthotic/Prosthetic Training  [x]           Gait Training                          [x]    Modalities  [x]           Therapeutic Exercises           []    Edema Management/Control  [x]           Therapeutic Activities            []    Family Training/Education  [x]           Patient Education  []           Other (comment):    Frequency/Duration: Patient will be followed by physical therapy 1-2 times per day/4-7 days per week to address goals. Discharge Recommendations: Home Health  Further Equipment Recommendations for Discharge: N/A     SUBJECTIVE:   Patient stated my le hurts a lot.     OBJECTIVE DATA SUMMARY:     Past Medical History:   Diagnosis Date    Acute prostatitis     Acute upper GI bleed     Allergic rhinitis     Arthritis     osteo    Benign neoplasm of large bowel     Chronic bronchitis (HCC)     Chronic pain     Diabetes ([address]     Diverticulosis of colon without hemorrhage     GERD (gastroesophageal reflux disease)     Hiatal hernia     Hypertension     Low blood sugar     Migraines     Pelvic pain in male     Peyronie's disease     Poison ivy 10/2020    Prostate nodule     Sleep apnea     cpap     Past Surgical History:   Procedure Laterality Date    HX GI      several colonoscopies and endoscopies    HX ORTHOPAEDIC Right     ankle scope    HX ORTHOPAEDIC Right     knee x3    HX ORTHOPAEDIC Bilateral     menicus HX UROLOGICAL      varicocele     Barriers to Learning/Limitations: yes;  anesthesia  Compensate with: Visual Cues, Verbal Cues, and Tactile Cues  Home Situation:  Home Situation  Home Environment: Private residence  # Steps to Enter: 3  Rails to Enter: Yes  Hand Rails : Bilateral (wide)  One/Two Story Residence: Two story, live on 1st floor  Lift Chair Available: No  Living Alone: No  Support Systems: Spouse/Significant Other  Patient Expects to be Discharged to[de-identified] Home with home health  Current DME Used/Available at Home: Dorothe Cloud, rolling,Tub transfer bench,Raised toilet seat,Walker, rollator  Tub or Shower Type: Tub/Shower combination  Critical Behavior:  Neurologic State: Alert  Orientation Level: Oriented to person;Oriented to place;Oriented to situation  Cognition: Follows commands  Safety/Judgement: Awareness of environment  Psychosocial  Patient Behaviors: Cooperative;Calm  Family  Behaviors: Calm;Supportive  Skin Condition/Temp: Dry  Family  Behaviors: Calm;Supportive  Skin Integrity: Incision (comment)  Skin Integumentary  Skin Color: Appropriate for ethnicity  Skin Condition/Temp: Dry  Skin Integrity: Incision (comment)  Turgor: Non-tenting  Strength:    Strength: Generally decreased, functional  Tone & Sensation:   Tone: Normal  Sensation: Impaired (R knee)  Range Of Motion:  AROM: Generally decreased, functional  Functional Mobility:  Bed Mobility:  Scooting: Stand-by assistance  Transfers:  Sit to Stand: Contact guard assistance;Stand-by assistance (vc)  Stand to Sit: Contact guard assistance;Stand-by assistance (vc)  Balance:   Sitting: Intact  Standing: Intact; With support  Ambulation/Gait Training:  Distance (ft): 150 Feet (ft)  Assistive Device: Walker, rolling;Gait belt  Ambulation - Level of Assistance: Contact guard assistance  Gait Abnormalities: Antalgic;Decreased step clearance; Step to gait  Base of Support: Shift to left  Stance: Right decreased  Speed/Mary [place]: Pace decreased (<100 feet/min)  Step Length: Left shortened;Right shortened  Swing Pattern: Right asymmetrical  Interventions: Tactile cues; Verbal cues; Visual/Demos  Stairs:  Number of Stairs Trained: 1  Stairs - Level of Assistance: Contact guard assistance  Rail Use: Right  (and bilateral UE support)     Therapeutic Exercises:   Encouraged HEP  Pain:  Pain level pre-treatment: 8/10   Pain level post-treatment: 8/10   Pain Intervention(s) : Medication (see MAR); Rest, Ice, Repositioning  Response to intervention: Nurse notified, See doc flow    Activity Tolerance:   Good  Please refer to the flowsheet for vital signs taken during this treatment. After treatment:   [x]         Patient left in no apparent distress sitting up in chair  []         Patient left in no apparent distress in bed  [x]         Call bell left within reach  [x]         Nursing notified  [x]         Caregiver present  []         Bed alarm activated  []         SCDs applied    COMMUNICATION/EDUCATION:   [x]         Role of Physical Therapy in the acute care setting. [x]         Fall prevention education was provided and the patient/caregiver indicated understanding. [x]         Patient/family have participated as able in goal setting and plan of care. [x]         Patient/family agree to work toward stated goals and plan of care. []         Patient understands intent and goals of therapy, but is neutral about his/her participation. []         Patient is unable to participate in goal setting/plan of care: ongoing with therapy staff.  []         Other:     Thank you for this referral.  Jose Vega, SPT   Time Calculation: 33 mins      Eval Complexity: History: MEDIUM  Complexity : 1-2 comorbidities / personal factors will impact the outcome/ POC Exam:MEDIUM Complexity : 3 Standardized tests and measures addressing body structure, function, activity limitation and / or participation in recreation  Presentation: LOW Complexity : Stable, uncomplicated  Clinical Decision Making:Low Complexity    Overall Complexity:LOW       Direct 1:1 supervision provided by Luz Elena Gill PT, SEUNT

## 2022-04-06 NOTE — OP NOTES
Right Tourniquetless Cruciate Retaining Cemented Total Knee Arthroplasty    Patient: Ana Solano MRN: [id number]  CSN: [id number]   YOB: 1970  Age: 45 y.o. Sex: male      Date of Surgery:4/6/2022    PREOPERATIVE DIAGNOSES : RIGHT KNEE OSTEOARTHRITIS    POSTOPERATIVE DIAGNOSES : RIGHT KNEE OSTEOARTHRITIS    PROCEDURE: Right tourniquetless cruciate retaining cemented total knee arthroplasty using the Attune knee system by [employer]. IMPLANTS:   Implant Name Type Inv. Item Serial No.  Lot No. LRB No. Used Action   CEMENT BNE 40GM FULL DOSE PMMA W/O ANTIBIO HI VISC N RADPQ - [id number]  CEMENT BNE 40GM FULL DOSE PMMA W/O ANTIBIO HI VISC N RADPQ  [facility] [id number] Right 1 Implanted   COMPONENT PAT [id number] KNEE POLY JOYA MEDIALIZED EULALIO ATTUNE - [id number]  COMPONENT PAT [id number] KNEE POLY JOYA MEDIALIZED EULALIO ATTUNE  [facility] [id number] Right 1 Implanted   COMPONENT FEM SZ 8 R KNEE CRUCE RET JOYA ATTUNE - [id number]  COMPONENT FEM SZ 8 R KNEE CRUCE RET JOYA ATTUNE  [facility] [id number] Right 1 Implanted   INSERT TIB SZ 8 THK5MM CRUCE RET ROT PLATFRM ATTUNE - [id number]  INSERT TIB SZ 8 THK5MM CRUCE RET ROT PLATFRM ATTUNE  [facility] [id number] Right 1 Implanted   BASE TIB SZ 7 JOYA PKT ATTUNE RP - [id number]  BASE TIB SZ 7 JOYA PKT ATTUNE RP  [facility] [id number] Right 1 Implanted       SURGEON: Johnnie Mena MD    FIRST ASSISTANT: Maru Benton PA-C    SECOND ASSISTANT: Physician Assistant: Orlando Garcia PA-C    ANESTHESIA: General    TOURNIQUET TIME:  None    SPECIMEN TO PATHOLOGY:  * No specimens in log *    ESTIMATED BLOOD LOSS: 75cc    FINDINGS:  Same    COMPLICATIONS:  None     INDICATIONS:  A 45 y.o. WHITE/NON- male  with known right severe gonarthrosis.   The patient has bone-on-bone arthritis by x-rays and has failed all conservative interventions including medications, weight loss, physical therapy, relative rest, ambulatory aids and injections. The patient now presents for a right total knee arthroplasty due to constant pain with activities of daily living and diminished safety due to patients pain. OPERATION:  The patient was brought to the operating theater   and, after adequate anesthesia, the right knee was prepped and draped in the   typical sterile fashion. The 1.95gm of po tranexamic acid was already administered 2 hours before surgery. The analgesic cocktail used for the case was 50cc of .25% Marcaine with epinephrine mixed with 30 mg( 1cc ) of Toradol and 30cc of NS ( total of 81 cc). 40cc's was injected in the skin and capsule/quadriceps after the incision. The Argon Wand was used during the case for bleeding control. An anterior midline   incision was then made from just above the patella to the tibial tubercle. The medial and lateral flaps were developed and then a sub vastus approach   to the knee was then performed. The dissection was carried on the proximal   medial tibia from anterior to posterior, taking the anterior half of the   medial meniscus. The lateral joint line was exposed up to the anterolateral   corner, and then the patella was slid lateral and the knee flexed to   greater than 90 degrees with Z retractors being placed. Findings at this   time showed significant arthritic change. The ACL was resected. The PCL was preserved. The external alignment guide for the [employer] system was then lined up with the first webspace. The guide was made   parallel to the anterior tibia and then the cutting guide was placed at a 5   degree slope. It was placed so that approximately a couple of millimeters were   taken off the lowest side. It was then pinned and the proximal tibia was   then resected. Tibial resection cut alignment was performed with the drop down umm and found to be aligned with the first web space.  The femur was then addressed next. The large drill bit was placed down the femoral canal and the distal femoral cutting guide was then pinned to the   anterior femur at 9 mm below the lowest surface, and was placed at a 5   degree valgus angle. The distal femur was then resected, and extension gap   was measured and the correct mm polyethylene thickness was selected. The knee was   then flexed to 90 degrees. The knee was kept at 90 degrees and  / sizer was inserted over the short IM umm and the anterior lateral femur was referenced. The size was then measured and noted. The guide was then distracted so flexion gap equalled extension gap ( and was stable ) and the guide was pinned. The femoral finishing guide was placed over the 2 pins and then 2 lock down pins were placed medially and laterally. The flexion gap   as checked again and found to be stable. The anterior and posterior cuts,   along with both chamfer cuts were then performed. The guide was removed, as   well as any free bony fragments. The posterior horns of both the medial and   lateral menisci were resected. The femoral resection guide was used to cut out the small amount of  bone for the CR femur. The attention was now turned to the tibia. The pickle fork was placed in the posterior part of the tibia and, using the lateral & medial knee retractors, the   tibia was brought into the wound. Any further bony work, as well as any   meniscal work was done at this time to remove these fragments. The tibia   was then sized with the tibial baseplate. This guide was aligned to the medial third of the tibial tubercle. It was pinned in position. The smokestack guide that was on the [employer] system was placed through the top of this baseplate, locking the plate in good position. The guide was then reamed down to the appropriate depth, and then the keel   punch was placed.  The guide system was removed and the trial tibial   polyethylene was snapped into position, and then the appropriate size femur was   placed on the distal femur. There was good fit to both components. The natural patella tracked very well. These components were selected for implantation. The patella was everted. It was measured and it was cut from the original thickness of 28 to the residual thickness of about 16 mm. It was cut by free hand technique and it was cut from the medial articular edge to the lateral articular edge. The patella was   then laterally electrocauterized and then the patellar clamp was   placed on the cut surface of the patella. It was placed perpendicular to   the trochlear groove and then it was flipped into position and the anatomic patella tracked well. The 3 peg holes were drilled and  the excessive bone on the lateral side was resected. The lateral retinaculum was released subperiosteally off the patella. The wound was then irrigated out copiously. The posterior medial and posterior lateral knee was injected with 20cc's each of the remaining analgesic cocktail. All trial components were removed and the real components were opened, and the [employer] HV cement was mixed on the back table. The knee was then Simpulse lavaged and dried. The cement was then packed on the inferior surface of the tibial baseplate with cement down the cone. The tibia was then impacted. All excessive cement was removed with pickups and a knife. The tibial   polyethylene was then placed into the baseplate, and   then the femur was cemented next with cement being put on the posterior   part of each femoral runner, and then an additional amount of cement placed   in a U-shaped pattern around medial femoral condyle, anterior femur, and   the lateral femoral condyle. This was hand packed into the distal femur. The femoral component was then placed, impacted into position. Any   excessive cement was removed with pickups and a knife.  The patella was   addressed next, and the anatomic patella was cemented by placing cement on the   posterior part of the patellar component. It was placed into the 3 drill   holes and held into position with a clamp until it hardened. The patient now had full extension to flexion of 130 degrees. Once the cement was hardened, the clamp was removed. The patella   tracked with a no-thumbs technique very nicely. The wound was irrigated out. The skin was closed with a running #2 Quill stitch. The shin was then closed with inverted 2-0 Vicryls. The skin was sealed with the Dermabond   Prineo skin system, dressed with a Mepilex dressing then 4x4, ABD's and Large Ace wrap from toes to thigh. The patient returned to the recovery room awake, in   stable condition. All instrument, sponge and needle counts were correct. The knee was dressed with a Mepilex and an Ace wrap and a knee immobilzer. During multiple steps in the procedure the simpulse lavage was used with a 500cc bag of normal saline with 30cc of 5% sterile opthalmologic povidone solution ( .30% ). Before component implantation the bone was irrigated  with normal saline on a bulb syringe. At the end of the case another 500cc normal saline bag (with 50,000 units of bacitracin and 500,000 units of polymixin )was attached to the simpulse lavage and the entire wound reirrgated before closure. A physician assistant was used during the surgery which contributes to better patient outcomes by decreasing operating room time and decreasing the amount of anesthesia the patient had to undergo. The physician assistant helped with positioning and draping of the patient for implantation of implants, retraction of soft tissues so as not to traumatize the tissues. During several parts of the procedure the PA used the Simpulse lavage to irrigate/suction the sterile field which contributed to a  surgical field and decrease in infection rates.  The physician assistant used the cauterization under my guidance to decrease blood loss which limits the need for blood transfusion in the post operative period. The physician assistant instilled local anesthetic which decreased the need for post operative narcotics. During closure the physician assistant was able to close the fascia layer independently using a running Stratafix closure system ensuring a water tight fascia closure and decreasing the risk of deep dylan-prosthetic infection. The superficial tissues were closed using inverted 2-0 Vicryl sutures by the physician assistant as well. The skin was closed using an Exofin skin closure system so that there was no discharge from the incision. This helps contribute to a lower risk of infection. During the procedure the physician assistant was given the task of irrigating the wound allowing myself to prepare the prosthesis for implantation.                Juan Cavanaugh MD  4/6/2022

## 2022-04-06 NOTE — PERIOP NOTES
Reviewed PTA medication list with patient/caregiver and patient/caregiver denies any additional medications. Patient admits to having a responsible adult care for them at home for at least 24 hours after surgery. Patient encouraged to use gown warming system and informed that using said warming gown to regulate body temperature prior to a procedure has been shown to help reduce the risks of blood clots and infection. Patient's pharmacy of choice verified and documented in PTA medication section. Dual skin assessment & fall risk band verification completed with Sajan Romero.

## 2022-06-29 ENCOUNTER — TRANSCRIBE ORDER (OUTPATIENT)
Dept: REGISTRATION | Age: 52
End: 2022-06-29

## 2022-06-29 ENCOUNTER — HOSPITAL ENCOUNTER (OUTPATIENT)
Dept: PREADMISSION TESTING | Age: 52
Discharge: HOME | End: 2022-06-29

## 2022-06-29 DIAGNOSIS — Z01.810 PREOP CARDIOVASCULAR EXAM: Primary | ICD-10-CM

## 2022-06-29 DIAGNOSIS — Z01.810 PREOP CARDIOVASCULAR EXAM: ICD-10-CM

## 2022-06-29 LAB
ALBUMIN SERPL-MCNC: 3.9 G/DL (ref 3.4–5)
ALBUMIN/GLOB SERPL: 1.4 {RATIO} (ref 0.8–1.7)
ALP SERPL-CCNC: 125 U/L (ref 45–117)
ALT SERPL-CCNC: 216 U/L (ref 16–61)
ANION GAP SERPL CALC-SCNC: 7 MMOL/L (ref 3–18)
APTT PPP: 25.6 SEC (ref 23–36.4)
AST SERPL-CCNC: 99 U/L (ref 10–38)
ATRIAL RATE: 80 BPM
BILIRUB SERPL-MCNC: 0.4 MG/DL (ref 0.2–1)
BUN SERPL-MCNC: 14 MG/DL (ref 7–18)
BUN/CREAT SERPL: 13 (ref 12–20)
CALCIUM SERPL-MCNC: 9.3 MG/DL (ref 8.5–10.1)
CALCULATED P AXIS, ECG09: 63 DEGREES
CALCULATED R AXIS, ECG10: -31 DEGREES
CALCULATED T AXIS, ECG11: 54 DEGREES
CHLORIDE SERPL-SCNC: 102 MMOL/L (ref 100–111)
CO2 SERPL-SCNC: 30 MMOL/L (ref 21–32)
CREAT SERPL-MCNC: 1.06 MG/DL (ref 0.6–1.3)
DIAGNOSIS, 93000: NORMAL
ERYTHROCYTE [DISTWIDTH] IN BLOOD BY AUTOMATED COUNT: 14.3 % (ref 11.6–14.5)
EST. AVERAGE GLUCOSE BLD GHB EST-MCNC: 154 MG/DL
GLOBULIN SER CALC-MCNC: 2.8 G/DL (ref 2–4)
GLUCOSE SERPL-MCNC: 148 MG/DL (ref 74–99)
HBA1C MFR BLD: 7 % (ref 4.2–5.6)
HCT VFR BLD AUTO: 38.9 % (ref 36–48)
HGB BLD-MCNC: 13.2 G/DL (ref 13–16)
INR PPP: 0.9 (ref 0.8–1.2)
MCH RBC QN AUTO: 31.7 PG (ref 24–34)
MCHC RBC AUTO-ENTMCNC: 33.9 G/DL (ref 31–37)
MCV RBC AUTO: 93.5 FL (ref 78–100)
NRBC # BLD: 0 K/UL (ref 0–0.01)
NRBC BLD-RTO: 0 PER 100 WBC
P-R INTERVAL, ECG05: 200 MS
PLATELET # BLD AUTO: 312 K/UL (ref 135–420)
PMV BLD AUTO: 8.9 FL (ref 9.2–11.8)
POTASSIUM SERPL-SCNC: 3.9 MMOL/L (ref 3.5–5.5)
PROT SERPL-MCNC: 6.7 G/DL (ref 6.4–8.2)
PROTHROMBIN TIME: 12.4 SEC (ref 11.5–15.2)
Q-T INTERVAL, ECG07: 376 MS
QRS DURATION, ECG06: 82 MS
QTC CALCULATION (BEZET), ECG08: 433 MS
RBC # BLD AUTO: 4.16 M/UL (ref 4.35–5.65)
SODIUM SERPL-SCNC: 139 MMOL/L (ref 136–145)
VENTRICULAR RATE, ECG03: 80 BPM
WBC # BLD AUTO: 7.3 K/UL (ref 4.6–13.2)

## 2022-06-29 PROCEDURE — 93005 ELECTROCARDIOGRAM TRACING: CPT

## 2022-06-29 PROCEDURE — 80053 COMPREHEN METABOLIC PANEL: CPT

## 2022-06-29 PROCEDURE — 85027 COMPLETE CBC AUTOMATED: CPT

## 2022-06-29 PROCEDURE — 85610 PROTHROMBIN TIME: CPT

## 2022-06-29 PROCEDURE — 36415 COLL VENOUS BLD VENIPUNCTURE: CPT

## 2022-06-29 PROCEDURE — 85730 THROMBOPLASTIN TIME PARTIAL: CPT

## 2022-06-29 PROCEDURE — 83036 HEMOGLOBIN GLYCOSYLATED A1C: CPT

## 2022-07-08 ENCOUNTER — HOSPITAL ENCOUNTER (OUTPATIENT)
Dept: PREADMISSION TESTING | Age: 52
Discharge: HOME | End: 2022-07-08

## 2022-07-08 VITALS — HEIGHT: 71 IN | WEIGHT: 175 LBS | BODY MASS INDEX: 24.5 KG/M2

## 2022-07-08 RX ORDER — SECUKINUMAB 150 MG/ML
150 INJECTION SUBCUTANEOUS
COMMUNITY

## 2022-07-08 RX ORDER — HYDROMORPHONE HYDROCHLORIDE 2 MG/1
2 TABLET ORAL
COMMUNITY
End: 2022-09-07

## 2022-07-08 RX ORDER — SODIUM CHLORIDE, SODIUM LACTATE, POTASSIUM CHLORIDE, CALCIUM CHLORIDE 600; 310; 30; 20 MG/100ML; MG/100ML; MG/100ML; MG/100ML
125 INJECTION, SOLUTION INTRAVENOUS CONTINUOUS
Status: CANCELLED | OUTPATIENT
Start: 2022-07-08

## 2022-07-08 RX ORDER — OXYCODONE AND ACETAMINOPHEN 10; 325 MG/1; MG/1
1 TABLET ORAL
COMMUNITY
End: 2022-08-26

## 2022-07-08 RX ORDER — HYDROCODONE BITARTRATE AND ACETAMINOPHEN 7.5; 3 MG/1; MG/1
1 TABLET ORAL
COMMUNITY
End: 2022-09-07

## 2022-07-08 RX ORDER — DICLOFENAC SODIUM 10 MG/G
2 GEL TOPICAL AS NEEDED
COMMUNITY

## 2022-07-08 RX ORDER — METHOTREXATE 2.5 MG/1
2.5 TABLET ORAL
COMMUNITY

## 2022-07-08 NOTE — PERIOP NOTES
PAT - SURGICAL PRE-ADMISSION INSTRUCTIONS    NAME:  Mehrdad Mills                                                          TODAY'S DATE:  7/8/2022    SURGERY DATE:  7/20/2022                                  SURGERY ARRIVAL TIME:   TBA    1. Do NOT eat or drink anything, including candy or gum, after MIDNIGHT on 7/20/2022 , unless you have specific instructions from your Surgeon or Anesthesia Provider to do so. 2. No smoking 24 hours before surgery. 3. No alcohol 24 hours prior to the day of surgery. 4. No recreational drugs for one week prior to the day of surgery. 5. Leave all valuables, including money/purse, at home. 6. Remove all jewelry, nail polish, makeup (including mascara); no lotions, powders, deodorant, or perfume/cologne/after shave. 7. Glasses/Contact lenses and Dentures may be worn to the hospital.  They will be removed prior to surgery. 8. Call your doctor if symptoms of a cold or illness develop within 24 ours prior to surgery. 9. AN ADULT MUST DRIVE YOU HOME AFTER OUTPATIENT SURGERY. 10. If you are having an OUTPATIENT procedure, please make arrangements for a responsible adult to be with you for 24 hours after your surgery. 11. If you are admitted to the hospital, you will be assigned to a bed after surgery is complete. Normally a family member will not be able to see you until you are in your assigned bed. 12. Visitation Restrictions Explained. Special Instructions:  Covid Test not needed. Patient not vaccinated, Quarantine requirements discussed  No Advanced Directive or DNR  Take these medications the morning of surgery with a sip of water:  metoprolol succinate, omeprazole, Percocet, Dilaudid, Bring durable medical equipment (DME) needed:  cane, HOLD oral diabetic medication on the MORNING OF surgery. , HOLD metformin/glucophage dose starting the EVENING BEFORE the day of surgery.     Patient Prep:    use CHG solution     These surgical instructions were reviewed with patient during the PAT phone call

## 2022-07-18 PROBLEM — T84.82XA ARTHROFIBROSIS OF TOTAL KNEE ARTHROPLASTY (HCC): Status: ACTIVE | Noted: 2022-07-18

## 2022-07-18 PROBLEM — T84.82XA ARTHROFIBROSIS OF TOTAL KNEE ARTHROPLASTY (HCC): Chronic | Status: ACTIVE | Noted: 2022-07-18

## 2022-07-18 NOTE — H&P
History and Physical        Patient: Alfie Cough               Sex: male          DOA: (Not on file)         YOB: 1970      Age:  45 y.o.        LOS:  LOS: 0 days        HPI:     Cayden Kaufman is in for follow up of his right ATTUNE TKA (130) from 04/06/22 with flexion and extension arthrofibrosis/knee effusion/negative aspirate for infection. The patient is in for follow up. Dr. Zen Deshpande aspirated the knee and sent it for culture, which came back negative. He is still having some swelling in his knee and is using a cane for ambulation. He is using Norco for pain management. AP, lateral, and sunrise views of the right knee were obtained and interpreted in the office and show good positioning of the components without evidence of migration or loosening. Past Medical History:   Diagnosis Date    Acute prostatitis     Acute upper GI bleed     Allergic rhinitis     Arthritis     osteo    Asthma     chronic bronchitis    Benign neoplasm of large bowel     Chronic bronchitis (HCC)     Chronic pain     Diabetes (HCC)     Diverticulosis of colon without hemorrhage     GERD (gastroesophageal reflux disease)     Hiatal hernia     Hypertension     Low blood sugar     Migraines     Pelvic pain in male     Peyronie's disease     Poison ivy 10/2020    Prostate nodule     Sleep apnea     cpap       Past Surgical History:   Procedure Laterality Date    HX GI      several colonoscopies and endoscopies    HX ORTHOPAEDIC Right     ankle scope    HX ORTHOPAEDIC Right     knee x3    HX ORTHOPAEDIC Bilateral     menicus     HX ORTHOPAEDIC Left 2019    thumb    HX UROLOGICAL      varicocele       No family history on file. Social History     Socioeconomic History    Marital status:    Tobacco Use    Smoking status: Never Smoker    Smokeless tobacco: Never Used   Vaping Use    Vaping Use: Never used   Substance and Sexual Activity    Alcohol use: No    Drug use:  No  Sexual activity: Yes       Prior to Admission medications    Medication Sig Start Date End Date Taking? Authorizing Provider   methotrexate (RHEUMATREX) 2.5 mg tablet Take 2.5 mg by mouth every Sunday. Takes 10 pills    Provider, Historical   secukinumab (Cosentyx) 150 mg/mL syrg 150 mg by SubCUTAneous route. Takes every other week taking  Mateus July 10, 2022    Provider, Historical   HYDROmorphone (Dilaudid) 2 mg tablet Take 2 mg by mouth every four (4) hours as needed for Pain. Provider, Historical   HYDROcodone-acetaminophen (XODOL) 7.5-300 mg tablet Take 1 Tablet by mouth. Provider, Historical   oxyCODONE-acetaminophen (Percocet)  mg per tablet Take 1 Tablet by mouth. Provider, Historical   diclofenac (Voltaren) 1 % gel Apply 2 g to affected area as needed for Pain. Provider, Historical   aspirin 81 mg chewable tablet Take 1 Tablet by mouth two (2) times a day. 4/6/22   Leah Farmer PA-C   metFORMIN (GLUCOPHAGE) 500 mg tablet Take 850 mg by mouth two (2) times daily (with meals). Provider, Historical   metoprolol succinate (TOPROL-XL) 25 mg XL tablet Take 25 mg by mouth daily.     Provider, Historical   testosterone (Androderm) 4 mg/24 hr pt24 APPLY 1 PATCH TOPICALLY ONCE DAILY 12/23/20   Juan C Gerber MD   ciclopirox (LOPROX) 1 % sham SHAMPOO SCALP TWICE WEEKLY, ALLOW LATHER TO SIT FOR 5 MINUTES THEN RINSE. 10/13/20   Provider, Historical   clobetasoL (TEMOVATE) 0.05 % ointment APPLY TO THE AFFECTED AREAS OF LEGS HANDS AND FEET TWICE A DAY FOR 2 WEEKS THEN AS NEEDED 10/13/20   Provider, Historical   fluocinoNIDE (LIDEX) 0.05 % external solution APPLY TO AFFECTED AREAS OF SCALP NIGHTLY FOR 1 WEEK THEN ONLY AS NEEDED FOR ITCHING AND FLAKING. 10/14/20   Provider, Historical   mometasone (ELOCON) 0.1 % topical cream APPLY TO DRY AREAS OF FACE TWICE DAILY FOR 2 WEEKS THEN ONLY AS NEEDED FOR DRY FLAKY AREAS RASH. 10/13/20   Provider, Historical   furosemide (LASIX) 20 mg tablet 40 mg daily. 6/12/19   Provider, Historical   predniSONE (DELTASONE) 5 mg tablet 5 mg two (2) times a day. 6/10/19   Provider, Historical   traMADol (ULTRAM-ER) 200 mg tablet Take 200 mg by mouth daily. Provider, Historical   aluminum & magnesium hydroxide-simethicone (MYLANTA II) 400-400-40 mg/5 mL suspension 30 mL. 2/20/18   Provider, Historical   DULoxetine (CYMBALTA) 30 mg capsule 60 mg daily. 4/10/18   Provider, Historical   tiZANidine (ZANAFLEX) 4 mg capsule Take 4 mg by mouth. Provider, Historical   promethazine (PHENERGAN) 12.5 mg tablet Take 12.5 mg by mouth every six (6) hours as needed for Nausea. Provider, Historical   SUMAtriptan (IMITREX) 25 mg tablet Take 100 mg by mouth once as needed for Migraine. Provider, Historical   omeprazole (PRILOSEC) 40 mg capsule Take 40 mg by mouth daily. Indications: GASTROESOPHAGEAL REFLUX    Provider, Historical   gabapentin (NEURONTIN) 400 mg capsule Take 600 mg by mouth three (3) times daily. Indications: neuropathic pain    Provider, Historical   amitriptyline (ELAVIL) 75 mg tablet Take 25 mg by mouth nightly. Indications: NEUROPATHIC PAIN    Provider, Historical   atorvastatin (LIPITOR) 10 mg tablet Take 80 mg by mouth nightly. Indications: high cholesterol    Provider, Historical   montelukast (SINGULAIR) 10 mg tablet Take 10 mg by mouth nightly. Indications: ALLERGIC RHINITIS    Provider, Historical   enalapril (VASOTEC) 2.5 mg tablet Take 5 mg by mouth nightly. Indications: high blood pressure    Provider, Historical   hyoscyamine SR (LEVBID) 0.375 mg SR tablet Take 375 mcg by mouth every twelve (12) hours. Indications: bladder dysfunction due to a nerve disorder    Provider, Historical       Allergies   Allergen Reactions    Crab Anaphylaxis    Cephalexin Other (comments)     Other reaction(s): tore up skin on bottom of feet    Ciprofloxacin Other (comments)     TRAUMATIC PLANTAR FASCIITIS, TENDON RUPTURE.     Codeine Unknown (comments) Other reaction(s): Severe Headache  Other reaction(s): other/intolerance  headaches    Other Medication Other (comments)     Antibiotic for diverticulitis casude problems with the tendon in the bottom of his foot-realized later in the conversation that it is ciprofloxacin    Sulfa (Sulfonamide Antibiotics) Unknown (comments)     Other reaction(s): unknown    Sulfasalazine Other (comments)     Other reaction(s): makes dizzy/room spinning       Review of Systems  GENERAL:  Patient has no signs of fever, chills or weight change. HEAD/ENTM:  Patient has no signs of headaches, dizziness, hearing loss, ringing in ears, sore throat/hoarseness, recent cold, double vision, blurred vision, itchy eyes, eye redness or eye discharge. NEUROLOGIC: Patient presents with muscle weakness and numbness/tingling. CARDIOVASCULAR:  Patient has no signs of chest pain, palpitations, rheumatic fever or heart murmur. RESPIRATORY:  Patient has no signs of chronic cough, wheezing, difficulty breathing, pain on breathing or shortness of breath. GASTROINTESTINAL:  Patient has no signs of nausea/vomiting, difficulty swallowing, gas/bloating, indigestion, abdominal pain, diarrhea, bloody stools or hemorrhoids. GENITOURINARY:  Patient has no signs of blood in urine, painful urinating, burning sensation, bladder/kidney infection, frequent urinating or incontinence. MUSCULOSKELETAL: Patient presents with joint stiffness and joint pain. Patient has no signs of fracture/dislocation, sprain/strain, tendonitis, rheumatoid disease, gout or swelling in feet. INTEGUMENTARY:  Patient has no signs of rash/itching, psoriasis, Raynaud's phenomenon or varicose veins. EMOTIONAL:  Patient has no signs of anxiety, depression, bipolar disorder, memory loss or change in mood. Physical Exam:      There were no vitals taken for this visit. Physical Exam:  Physical exam shows his right knee is probably 5 to 8 degrees away from straight.   His flexion is only 85 degrees. He has pain as I attempt to push him beyond it. He has a knee effusion that is about 20 cc. His knee is warm, but not hot to touch, and it is only anteriorly. There are some small areas of old stitch abscesses that are present. The patient has good capillary refill with normal motor strength of the foot and ankle and normal light-touch sensation in the foot and lower leg. Assessment/Plan     Principal Problem:    Arthrofibrosis of total knee arthroplasty ([facility] (7/18/2022)    Active Problems:    ILD (interstitial lung disease) ([facility] (10/11/2016)      Allergic rhinitis (10/24/2008)      IBS (irritable bowel syndrome) (8/28/2018)      Overview: Overview:       POSS - PER GI      Peyronie's disease (4/14/2009)      Drug-induced immunodeficiency ([facility] (11/22/2016)      Long term (current) use of systemic steroids (7/16/2018)      Psoriasis with arthropathy ([facility] (10/25/2016)      Overview: Last Assessment & Plan:        will initiate Orencia 125 milligram subcu weekly, continue the Otezla and       discontinue the Humira for lack of effectiveness. Information regarding       Doren Mail and pre authorization provided. Plan follow up in 2 months. He       will resume prednisone and titrate dose to comfort until Orencia becomes       effective. Diabetes mellitus type 2, controlled ([facility] (4/5/2022)      High cholesterol (4/5/2022)      HTN (hypertension) (4/5/2022)      Sleep apnea (4/5/2022)      Dr. Nannette Watson scheduled him for a right TKA manipulation under anesthesia with a right knee aspiration and cortisone injection. Dr. Nannette Watson  arranged for therapy to start again the day after. He can do it now in the meantime. He explained the importance of regaining full extension to full flexion in his knee because he is certainly not rehabbing it as quickly as Dr. Nannette Watson would like. Dr. Nannette Watson will see him two weeks after the manipulation to see how he is doing.   He will continue with his Norco for now.

## 2022-08-19 ENCOUNTER — TRANSCRIBE ORDER (OUTPATIENT)
Dept: REGISTRATION | Age: 52
End: 2022-08-19

## 2022-08-19 ENCOUNTER — HOSPITAL ENCOUNTER (OUTPATIENT)
Dept: PREADMISSION TESTING | Age: 52
Discharge: HOME | End: 2022-08-19

## 2022-08-19 DIAGNOSIS — M25.661 LIMITATION OF JOINT MOTION OF KNEE, RIGHT: Primary | ICD-10-CM

## 2022-08-19 DIAGNOSIS — M25.661 LIMITATION OF JOINT MOTION OF KNEE, RIGHT: ICD-10-CM

## 2022-08-19 LAB
ALBUMIN SERPL-MCNC: 3.6 G/DL (ref 3.4–5)
ALBUMIN/GLOB SERPL: 1.4 {RATIO} (ref 0.8–1.7)
ALP SERPL-CCNC: 130 U/L (ref 45–117)
ALT SERPL-CCNC: 173 U/L (ref 16–61)
ANION GAP SERPL CALC-SCNC: 6 MMOL/L (ref 3–18)
APTT PPP: 24.8 SEC (ref 23–36.4)
AST SERPL-CCNC: 86 U/L (ref 10–38)
BILIRUB SERPL-MCNC: 0.7 MG/DL (ref 0.2–1)
BUN SERPL-MCNC: 9 MG/DL (ref 7–18)
BUN/CREAT SERPL: 9 (ref 12–20)
CALCIUM SERPL-MCNC: 9 MG/DL (ref 8.5–10.1)
CHLORIDE SERPL-SCNC: 101 MMOL/L (ref 100–111)
CO2 SERPL-SCNC: 31 MMOL/L (ref 21–32)
CREAT SERPL-MCNC: 0.99 MG/DL (ref 0.6–1.3)
ERYTHROCYTE [DISTWIDTH] IN BLOOD BY AUTOMATED COUNT: 15.3 % (ref 11.6–14.5)
EST. AVERAGE GLUCOSE BLD GHB EST-MCNC: 148 MG/DL
GLOBULIN SER CALC-MCNC: 2.6 G/DL (ref 2–4)
GLUCOSE SERPL-MCNC: 231 MG/DL (ref 74–99)
HBA1C MFR BLD: 6.8 % (ref 4.2–5.6)
HCT VFR BLD AUTO: 39.5 % (ref 36–48)
HGB BLD-MCNC: 13.1 G/DL (ref 13–16)
INR PPP: 0.9 (ref 0.8–1.2)
MCH RBC QN AUTO: 32.1 PG (ref 24–34)
MCHC RBC AUTO-ENTMCNC: 33.2 G/DL (ref 31–37)
MCV RBC AUTO: 96.8 FL (ref 78–100)
NRBC # BLD: 0 K/UL (ref 0–0.01)
NRBC BLD-RTO: 0 PER 100 WBC
PLATELET # BLD AUTO: 314 K/UL (ref 135–420)
PMV BLD AUTO: 9 FL (ref 9.2–11.8)
POTASSIUM SERPL-SCNC: 3.9 MMOL/L (ref 3.5–5.5)
PROT SERPL-MCNC: 6.2 G/DL (ref 6.4–8.2)
PROTHROMBIN TIME: 12.5 SEC (ref 11.5–15.2)
RBC # BLD AUTO: 4.08 M/UL (ref 4.35–5.65)
SODIUM SERPL-SCNC: 138 MMOL/L (ref 136–145)
WBC # BLD AUTO: 11.8 K/UL (ref 4.6–13.2)

## 2022-08-19 PROCEDURE — 85730 THROMBOPLASTIN TIME PARTIAL: CPT

## 2022-08-19 PROCEDURE — 36415 COLL VENOUS BLD VENIPUNCTURE: CPT

## 2022-08-19 PROCEDURE — 80053 COMPREHEN METABOLIC PANEL: CPT

## 2022-08-19 PROCEDURE — 93005 ELECTROCARDIOGRAM TRACING: CPT

## 2022-08-19 PROCEDURE — 85610 PROTHROMBIN TIME: CPT

## 2022-08-19 PROCEDURE — 85027 COMPLETE CBC AUTOMATED: CPT

## 2022-08-19 PROCEDURE — 83036 HEMOGLOBIN GLYCOSYLATED A1C: CPT

## 2022-08-21 LAB
ATRIAL RATE: 91 BPM
CALCULATED P AXIS, ECG09: 57 DEGREES
CALCULATED R AXIS, ECG10: -34 DEGREES
CALCULATED T AXIS, ECG11: 53 DEGREES
DIAGNOSIS, 93000: NORMAL
P-R INTERVAL, ECG05: 188 MS
Q-T INTERVAL, ECG07: 350 MS
QRS DURATION, ECG06: 98 MS
QTC CALCULATION (BEZET), ECG08: 430 MS
VENTRICULAR RATE, ECG03: 91 BPM

## 2022-08-26 ENCOUNTER — HOSPITAL ENCOUNTER (OUTPATIENT)
Dept: PREADMISSION TESTING | Age: 52
Discharge: HOME | End: 2022-08-26

## 2022-08-26 VITALS — WEIGHT: 175 LBS | BODY MASS INDEX: 24.5 KG/M2 | HEIGHT: 71 IN

## 2022-08-26 RX ORDER — LIDOCAINE 50 MG/G
PATCH TOPICAL EVERY 24 HOURS
COMMUNITY

## 2022-08-26 RX ORDER — SODIUM CHLORIDE, SODIUM LACTATE, POTASSIUM CHLORIDE, CALCIUM CHLORIDE 600; 310; 30; 20 MG/100ML; MG/100ML; MG/100ML; MG/100ML
125 INJECTION, SOLUTION INTRAVENOUS CONTINUOUS
Status: CANCELLED | OUTPATIENT
Start: 2022-09-07

## 2022-08-26 RX ORDER — ALBUTEROL SULFATE 0.83 MG/ML
SOLUTION RESPIRATORY (INHALATION)
COMMUNITY

## 2022-08-26 NOTE — PERIOP NOTES
PAT - SURGICAL PRE-ADMISSION INSTRUCTIONS    NAME:  Mehrdad Mills                                                          TODAY'S DATE:  8/26/2022    SURGERY DATE:  9/7/2022                                  SURGERY ARRIVAL TIME:   TBD    Do NOT eat or drink anything, including candy or gum, after MIDNIGHT on 9/7/2022 , unless you have specific instructions from your Surgeon or Anesthesia Provider to do so. No smoking 24 hours before surgery. No alcohol 24 hours prior to the day of surgery. No recreational drugs for one week prior to the day of surgery. Leave all valuables, including money/purse, at home. Remove all jewelry, nail polish, makeup (including mascara); no lotions, powders, deodorant, or perfume/cologne/after shave. Glasses/Contact lenses and Dentures may be worn to the hospital.  They will be removed prior to surgery. Call your doctor if symptoms of a cold or illness develop within 24 ours prior to surgery. AN ADULT MUST DRIVE YOU HOME AFTER OUTPATIENT SURGERY. If you are having an OUTPATIENT procedure, please make arrangements for a responsible adult to be with you for 24 hours after your surgery. If you are admitted to the hospital, you will be assigned to a bed after surgery is complete. Normally a family member will not be able to see you until you are in your assigned bed. 12. Visitation Restrictions Explained. Pt is not spec pop  Pt denies an advanced directive, pt denies DNR. Special Instructions:  Pt is not vaccinated. Follow up with MD for further instructions with aspirin  May take duloxetine, metoprolol, omeprazole on dos with small sip of water  Do not take tizanidine, furosemide on dos  Omit metformin dose pm on 9/6 and am dose on 9/7  Pt instructed to avoid NSAIDs 7 days prior to procedure per MDA. Pt instructed to hold vitamins/supplements 2 weeks prior to procedure per MDA. Patient Prep:  use CHG solution, pt received.    These surgical instructions were reviewed with patient during the PAT phone interview.

## 2022-09-06 RX ORDER — SODIUM CHLORIDE 0.9 % (FLUSH) 0.9 %
5-40 SYRINGE (ML) INJECTION EVERY 8 HOURS
Status: CANCELLED | OUTPATIENT
Start: 2022-09-06

## 2022-09-06 RX ORDER — SODIUM CHLORIDE 0.9 % (FLUSH) 0.9 %
5-40 SYRINGE (ML) INJECTION AS NEEDED
Status: CANCELLED | OUTPATIENT
Start: 2022-09-06

## 2022-09-06 NOTE — H&P
History and Physical           Patient: Kaelb Duong               Sex: male          DOA: (Not on file)          YOB: 1970      Age:  45 y.o.        LOS:  LOS: 0 days         HPI:      Bosie Felty is in for follow up of his right ATTUNE TKA (130) from 04/06/22 with flexion and extension arthrofibrosis/knee effusion/negative aspirate for infection. The patient is in for follow up. Dr. Joaquin Alvarado aspirated the knee and sent it for culture, which came back negative. He is still having some swelling in his knee and is using a cane for ambulation. He is using Norco for pain management. AP, lateral, and sunrise views of the right knee were obtained and interpreted in the office and show good positioning of the components without evidence of migration or loosening. Past Medical History:   Diagnosis Date    Acute prostatitis      Acute upper GI bleed      Allergic rhinitis      Arthritis       osteo    Asthma       chronic bronchitis    Benign neoplasm of large bowel      Chronic bronchitis (HCC)      Chronic pain      Diabetes ([address]      Diverticulosis of colon without hemorrhage      GERD (gastroesophageal reflux disease)      Hiatal hernia      Hypertension      Low blood sugar      Migraines      Pelvic pain in male      Peyronie's disease      Poison ivy 10/2020    Prostate nodule      Sleep apnea       cpap               Past Surgical History:   Procedure Laterality Date    HX GI         several colonoscopies and endoscopies    HX ORTHOPAEDIC Right       ankle scope    HX ORTHOPAEDIC Right       knee x3    HX ORTHOPAEDIC Bilateral       menicus     HX ORTHOPAEDIC Left 2019     thumb    HX UROLOGICAL         varicocele         No family history on file.      Social History              Socioeconomic History    Marital status:    Tobacco Use    Smoking status: Never Smoker    Smokeless tobacco: Never Used   Vaping Use    Vaping Use: Never used Substance and Sexual Activity    Alcohol use: No    Drug use: No    Sexual activity: Yes                    Prior to Admission medications    Medication Sig Start Date End Date Taking? Authorizing Provider   methotrexate (RHEUMATREX) 2.5 mg tablet Take 2.5 mg by mouth every Sunday. Takes 10 pills       Provider, Historical   secukinumab (Cosentyx) 150 mg/mL syrg 150 mg by SubCUTAneous route. Takes every other week taking  Mateus July 10, 2022       Provider, Historical   HYDROmorphone (Dilaudid) 2 mg tablet Take 2 mg by mouth every four (4) hours as needed for Pain. Provider, Historical   HYDROcodone-acetaminophen (XODOL) 7.5-300 mg tablet Take 1 Tablet by mouth. Provider, Historical   oxyCODONE-acetaminophen (Percocet)  mg per tablet Take 1 Tablet by mouth. Provider, Historical   diclofenac (Voltaren) 1 % gel Apply 2 g to affected area as needed for Pain. Provider, Historical   aspirin 81 mg chewable tablet Take 1 Tablet by mouth two (2) times a day. 4/6/22     Bibi Farmer PA-C   metFORMIN (GLUCOPHAGE) 500 mg tablet Take 850 mg by mouth two (2) times daily (with meals). Provider, Historical   metoprolol succinate (TOPROL-XL) 25 mg XL tablet Take 25 mg by mouth daily.        Provider, Historical   testosterone (Androderm) 4 mg/24 hr pt24 APPLY 1 PATCH TOPICALLY ONCE DAILY 12/23/20     Max Jennings MD   ciclopirox (LOPROX) 1 % sham SHAMPOO SCALP TWICE WEEKLY, ALLOW LATHER TO SIT FOR 5 MINUTES THEN RINSE. 10/13/20     Provider, Historical   clobetasoL (TEMOVATE) 0.05 % ointment APPLY TO THE AFFECTED AREAS OF LEGS HANDS AND FEET TWICE A DAY FOR 2 WEEKS THEN AS NEEDED 10/13/20     Provider, Historical   fluocinoNIDE (LIDEX) 0.05 % external solution APPLY TO AFFECTED AREAS OF SCALP NIGHTLY FOR 1 WEEK THEN ONLY AS NEEDED FOR ITCHING AND FLAKING. 10/14/20     Provider, Historical   mometasone (ELOCON) 0.1 % topical cream APPLY TO DRY AREAS OF FACE TWICE DAILY FOR 2 WEEKS THEN ONLY AS NEEDED FOR DRY FLAKY AREAS RASH. 10/13/20     Provider, Historical   furosemide (LASIX) 20 mg tablet 40 mg daily. 6/12/19     Provider, Historical   predniSONE (DELTASONE) 5 mg tablet 5 mg two (2) times a day. 6/10/19     Provider, Historical   traMADol (ULTRAM-ER) 200 mg tablet Take 200 mg by mouth daily. Provider, Historical   aluminum & magnesium hydroxide-simethicone (MYLANTA II) 400-400-40 mg/5 mL suspension 30 mL. 2/20/18     Provider, Historical   DULoxetine (CYMBALTA) 30 mg capsule 60 mg daily. 4/10/18     Provider, Historical   tiZANidine (ZANAFLEX) 4 mg capsule Take 4 mg by mouth. Provider, Historical   promethazine (PHENERGAN) 12.5 mg tablet Take 12.5 mg by mouth every six (6) hours as needed for Nausea. Provider, Historical   SUMAtriptan (IMITREX) 25 mg tablet Take 100 mg by mouth once as needed for Migraine. Provider, Historical   omeprazole (PRILOSEC) 40 mg capsule Take 40 mg by mouth daily. Indications: GASTROESOPHAGEAL REFLUX       Provider, Historical   gabapentin (NEURONTIN) 400 mg capsule Take 600 mg by mouth three (3) times daily. Indications: neuropathic pain       Provider, Historical   amitriptyline (ELAVIL) 75 mg tablet Take 25 mg by mouth nightly. Indications: NEUROPATHIC PAIN       Provider, Historical   atorvastatin (LIPITOR) 10 mg tablet Take 80 mg by mouth nightly. Indications: high cholesterol       Provider, Historical   montelukast (SINGULAIR) 10 mg tablet Take 10 mg by mouth nightly. Indications: ALLERGIC RHINITIS       Provider, Historical   enalapril (VASOTEC) 2.5 mg tablet Take 5 mg by mouth nightly. Indications: high blood pressure       Provider, Historical   hyoscyamine SR (LEVBID) 0.375 mg SR tablet Take 375 mcg by mouth every twelve (12) hours.  Indications: bladder dysfunction due to a nerve disorder       Provider, Historical               Allergies   Allergen Reactions    Crab Anaphylaxis    Cephalexin Other (comments)       Other reaction(s): tore up skin on bottom of feet    Ciprofloxacin Other (comments)       TRAUMATIC PLANTAR FASCIITIS, TENDON RUPTURE. Codeine Unknown (comments)       Other reaction(s): Severe Headache  Other reaction(s): other/intolerance  headaches    Other Medication Other (comments)       Antibiotic for diverticulitis casude problems with the tendon in the bottom of his foot-realized later in the conversation that it is ciprofloxacin    Sulfa (Sulfonamide Antibiotics) Unknown (comments)       Other reaction(s): unknown    Sulfasalazine Other (comments)       Other reaction(s): makes dizzy/room spinning         Review of Systems  GENERAL:  Patient has no signs of fever, chills or weight change. HEAD/ENTM:  Patient has no signs of headaches, dizziness, hearing loss, ringing in ears, sore throat/hoarseness, recent cold, double vision, blurred vision, itchy eyes, eye redness or eye discharge. NEUROLOGIC: Patient presents with muscle weakness and numbness/tingling. CARDIOVASCULAR:  Patient has no signs of chest pain, palpitations, rheumatic fever or heart murmur. RESPIRATORY:  Patient has no signs of chronic cough, wheezing, difficulty breathing, pain on breathing or shortness of breath. GASTROINTESTINAL:  Patient has no signs of nausea/vomiting, difficulty swallowing, gas/bloating, indigestion, abdominal pain, diarrhea, bloody stools or hemorrhoids. GENITOURINARY:  Patient has no signs of blood in urine, painful urinating, burning sensation, bladder/kidney infection, frequent urinating or incontinence. MUSCULOSKELETAL: Patient presents with joint stiffness and joint pain. Patient has no signs of fracture/dislocation, sprain/strain, tendonitis, rheumatoid disease, gout or swelling in feet. INTEGUMENTARY:  Patient has no signs of rash/itching, psoriasis, Raynaud's phenomenon or varicose veins. EMOTIONAL:  Patient has no signs of anxiety, depression, bipolar disorder, memory loss or change in mood. Physical Exam:      There were no vitals taken for this visit. Physical Exam:  Physical exam shows his right knee is probably 5 to 8 degrees away from straight. His flexion is only 85 degrees. He has pain as I attempt to push him beyond it. He has a knee effusion that is about 20 cc. His knee is warm, but not hot to touch, and it is only anteriorly. There are some small areas of old stitch abscesses that are present. The patient has good capillary refill with normal motor strength of the foot and ankle and normal light-touch sensation in the foot and lower leg. Assessment/Plan      Principal Problem:    Arthrofibrosis of total knee arthroplasty ([facility] (7/18/2022)     Active Problems:    ILD (interstitial lung disease) ([facility] (10/11/2016)       Allergic rhinitis (10/24/2008)       IBS (irritable bowel syndrome) (8/28/2018)      Overview: Overview:       POSS - PER GI       Peyronie's disease (4/14/2009)       Drug-induced immunodeficiency ([facility] (11/22/2016)       Long term (current) use of systemic steroids (7/16/2018)       Psoriasis with arthropathy ([facility] (10/25/2016)      Overview: Last Assessment & Plan:        will initiate Orencia 125 milligram subcu weekly, continue the Otezla and       discontinue the Humira for lack of effectiveness. Information regarding       [facility] and pre authorization provided. Plan follow up in 2 months. He       will resume prednisone and titrate dose to comfort until Orencia becomes       effective. Diabetes mellitus type 2, controlled ([address] (4/5/2022)       High cholesterol (4/5/2022)       HTN (hypertension) (4/5/2022)       Sleep apnea (4/5/2022)        Dr. John Strange scheduled him for a right TKA manipulation under anesthesia with a right knee aspiration and cortisone injection. Dr. John Strange  arranged for therapy to start again the day after. He can do it now in the meantime.  He explained the importance of regaining full extension to full flexion in his knee because he is certainly not rehabbing it as quickly as Dr. Domonique Vasquez would like. Dr. Domonique Vasquez will see him two weeks after the manipulation to see how he is doing. He will continue with his Norco for now.

## 2022-09-07 ENCOUNTER — ANESTHESIA EVENT (OUTPATIENT)
Dept: SURGERY | Age: 52
End: 2022-09-07
Payer: OTHER GOVERNMENT

## 2022-09-07 ENCOUNTER — ANESTHESIA (OUTPATIENT)
Dept: SURGERY | Age: 52
End: 2022-09-07
Payer: OTHER GOVERNMENT

## 2022-09-07 ENCOUNTER — HOSPITAL ENCOUNTER (OUTPATIENT)
Age: 52
Discharge: HOME | End: 2022-09-07
Attending: ORTHOPAEDIC SURGERY | Admitting: ORTHOPAEDIC SURGERY
Payer: OTHER GOVERNMENT

## 2022-09-07 VITALS
TEMPERATURE: 97.4 F | HEART RATE: 65 BPM | RESPIRATION RATE: 18 BRPM | DIASTOLIC BLOOD PRESSURE: 79 MMHG | OXYGEN SATURATION: 96 % | SYSTOLIC BLOOD PRESSURE: 123 MMHG

## 2022-09-07 DIAGNOSIS — T84.82XD ARTHROFIBROSIS OF TOTAL KNEE REPLACEMENT, SUBSEQUENT ENCOUNTER: Primary | Chronic | ICD-10-CM

## 2022-09-07 LAB
GLUCOSE BLD STRIP.AUTO-MCNC: 134 MG/DL (ref 70–110)
GLUCOSE BLD STRIP.AUTO-MCNC: 151 MG/DL (ref 70–110)

## 2022-09-07 PROCEDURE — 74011250637 HC RX REV CODE- 250/637: Performed by: ORTHOPAEDIC SURGERY

## 2022-09-07 PROCEDURE — 74011250636 HC RX REV CODE- 250/636: Performed by: NURSE ANESTHETIST, CERTIFIED REGISTERED

## 2022-09-07 PROCEDURE — 64447 NJX AA&/STRD FEMORAL NRV IMG: CPT | Performed by: ANESTHESIOLOGY

## 2022-09-07 PROCEDURE — 74011000250 HC RX REV CODE- 250: Performed by: ANESTHESIOLOGY

## 2022-09-07 PROCEDURE — 77030020782 HC GWN BAIR PAWS FLX 3M -B: Performed by: ORTHOPAEDIC SURGERY

## 2022-09-07 PROCEDURE — 74011000250 HC RX REV CODE- 250: Performed by: NURSE ANESTHETIST, CERTIFIED REGISTERED

## 2022-09-07 PROCEDURE — 76010000154 HC OR TIME FIRST 0.5 HR: Performed by: ORTHOPAEDIC SURGERY

## 2022-09-07 PROCEDURE — 74011000250 HC RX REV CODE- 250: Performed by: ORTHOPAEDIC SURGERY

## 2022-09-07 PROCEDURE — 76060000031 HC ANESTHESIA FIRST 0.5 HR: Performed by: ORTHOPAEDIC SURGERY

## 2022-09-07 PROCEDURE — 76210000026 HC REC RM PH II 1 TO 1.5 HR: Performed by: ORTHOPAEDIC SURGERY

## 2022-09-07 PROCEDURE — 74011250636 HC RX REV CODE- 250/636: Performed by: ANESTHESIOLOGY

## 2022-09-07 PROCEDURE — 74011250636 HC RX REV CODE- 250/636: Performed by: ORTHOPAEDIC SURGERY

## 2022-09-07 PROCEDURE — 74011250637 HC RX REV CODE- 250/637: Performed by: ANESTHESIOLOGY

## 2022-09-07 PROCEDURE — 77030016060 HC NDL NRV BLK TELE -A: Performed by: ANESTHESIOLOGY

## 2022-09-07 PROCEDURE — 82962 GLUCOSE BLOOD TEST: CPT

## 2022-09-07 RX ORDER — HYDROCODONE BITARTRATE AND ACETAMINOPHEN 5; 325 MG/1; MG/1
1-2 TABLET ORAL
Qty: 30 TABLET | Refills: 0 | Status: SHIPPED | OUTPATIENT
Start: 2022-09-07 | End: 2022-09-12

## 2022-09-07 RX ORDER — ONDANSETRON 2 MG/ML
INJECTION INTRAMUSCULAR; INTRAVENOUS AS NEEDED
Status: DISCONTINUED | OUTPATIENT
Start: 2022-09-07 | End: 2022-09-07 | Stop reason: HOSPADM

## 2022-09-07 RX ORDER — SODIUM CHLORIDE, SODIUM LACTATE, POTASSIUM CHLORIDE, CALCIUM CHLORIDE 600; 310; 30; 20 MG/100ML; MG/100ML; MG/100ML; MG/100ML
125 INJECTION, SOLUTION INTRAVENOUS CONTINUOUS
Status: DISCONTINUED | OUTPATIENT
Start: 2022-09-07 | End: 2022-09-07 | Stop reason: HOSPADM

## 2022-09-07 RX ORDER — SODIUM CHLORIDE 0.9 % (FLUSH) 0.9 %
5-40 SYRINGE (ML) INJECTION EVERY 8 HOURS
Status: DISCONTINUED | OUTPATIENT
Start: 2022-09-07 | End: 2022-09-07 | Stop reason: HOSPADM

## 2022-09-07 RX ORDER — OXYCODONE HYDROCHLORIDE 5 MG/1
5 TABLET ORAL
Status: COMPLETED | OUTPATIENT
Start: 2022-09-07 | End: 2022-09-07

## 2022-09-07 RX ORDER — FLUMAZENIL 0.1 MG/ML
0.2 INJECTION INTRAVENOUS
Status: DISCONTINUED | OUTPATIENT
Start: 2022-09-07 | End: 2022-09-07 | Stop reason: HOSPADM

## 2022-09-07 RX ORDER — BUPIVACAINE HYDROCHLORIDE AND EPINEPHRINE 2.5; 5 MG/ML; UG/ML
INJECTION, SOLUTION EPIDURAL; INFILTRATION; INTRACAUDAL; PERINEURAL AS NEEDED
Status: DISCONTINUED | OUTPATIENT
Start: 2022-09-07 | End: 2022-09-07 | Stop reason: HOSPADM

## 2022-09-07 RX ORDER — ACETAMINOPHEN 500 MG
1000 TABLET ORAL ONCE
Status: COMPLETED | OUTPATIENT
Start: 2022-09-07 | End: 2022-09-07

## 2022-09-07 RX ORDER — DEXAMETHASONE SODIUM PHOSPHATE 100 MG/10ML
INJECTION INTRAMUSCULAR; INTRAVENOUS AS NEEDED
Status: DISCONTINUED | OUTPATIENT
Start: 2022-09-07 | End: 2022-09-07 | Stop reason: HOSPADM

## 2022-09-07 RX ORDER — ONDANSETRON 2 MG/ML
4 INJECTION INTRAMUSCULAR; INTRAVENOUS ONCE
Status: DISCONTINUED | OUTPATIENT
Start: 2022-09-07 | End: 2022-09-07 | Stop reason: HOSPADM

## 2022-09-07 RX ORDER — SODIUM CHLORIDE 0.9 % (FLUSH) 0.9 %
5-40 SYRINGE (ML) INJECTION AS NEEDED
Status: DISCONTINUED | OUTPATIENT
Start: 2022-09-07 | End: 2022-09-07 | Stop reason: HOSPADM

## 2022-09-07 RX ORDER — ROPIVACAINE HYDROCHLORIDE 5 MG/ML
INJECTION, SOLUTION EPIDURAL; INFILTRATION; PERINEURAL AS NEEDED
Status: DISCONTINUED | OUTPATIENT
Start: 2022-09-07 | End: 2022-09-07 | Stop reason: HOSPADM

## 2022-09-07 RX ORDER — LIDOCAINE HYDROCHLORIDE 20 MG/ML
INJECTION, SOLUTION EPIDURAL; INFILTRATION; INTRACAUDAL; PERINEURAL AS NEEDED
Status: DISCONTINUED | OUTPATIENT
Start: 2022-09-07 | End: 2022-09-07 | Stop reason: HOSPADM

## 2022-09-07 RX ORDER — SODIUM CHLORIDE, SODIUM LACTATE, POTASSIUM CHLORIDE, CALCIUM CHLORIDE 600; 310; 30; 20 MG/100ML; MG/100ML; MG/100ML; MG/100ML
100 INJECTION, SOLUTION INTRAVENOUS CONTINUOUS
Status: DISCONTINUED | OUTPATIENT
Start: 2022-09-07 | End: 2022-09-07 | Stop reason: HOSPADM

## 2022-09-07 RX ORDER — PROPOFOL 10 MG/ML
INJECTION, EMULSION INTRAVENOUS AS NEEDED
Status: DISCONTINUED | OUTPATIENT
Start: 2022-09-07 | End: 2022-09-07 | Stop reason: HOSPADM

## 2022-09-07 RX ORDER — MIDAZOLAM HYDROCHLORIDE 1 MG/ML
INJECTION, SOLUTION INTRAMUSCULAR; INTRAVENOUS AS NEEDED
Status: DISCONTINUED | OUTPATIENT
Start: 2022-09-07 | End: 2022-09-07 | Stop reason: HOSPADM

## 2022-09-07 RX ORDER — NALOXONE HYDROCHLORIDE 0.4 MG/ML
0.4 INJECTION, SOLUTION INTRAMUSCULAR; INTRAVENOUS; SUBCUTANEOUS AS NEEDED
Status: DISCONTINUED | OUTPATIENT
Start: 2022-09-07 | End: 2022-09-07 | Stop reason: HOSPADM

## 2022-09-07 RX ORDER — DEXMEDETOMIDINE HYDROCHLORIDE 100 UG/ML
INJECTION, SOLUTION INTRAVENOUS AS NEEDED
Status: DISCONTINUED | OUTPATIENT
Start: 2022-09-07 | End: 2022-09-07 | Stop reason: HOSPADM

## 2022-09-07 RX ORDER — FENTANYL CITRATE 50 UG/ML
INJECTION, SOLUTION INTRAMUSCULAR; INTRAVENOUS AS NEEDED
Status: DISCONTINUED | OUTPATIENT
Start: 2022-09-07 | End: 2022-09-07 | Stop reason: HOSPADM

## 2022-09-07 RX ORDER — METHYLPREDNISOLONE ACETATE 40 MG/ML
INJECTION, SUSPENSION INTRA-ARTICULAR; INTRALESIONAL; INTRAMUSCULAR; SOFT TISSUE AS NEEDED
Status: DISCONTINUED | OUTPATIENT
Start: 2022-09-07 | End: 2022-09-07 | Stop reason: HOSPADM

## 2022-09-07 RX ORDER — FENTANYL CITRATE 50 UG/ML
50 INJECTION, SOLUTION INTRAMUSCULAR; INTRAVENOUS
Status: DISCONTINUED | OUTPATIENT
Start: 2022-09-07 | End: 2022-09-07 | Stop reason: HOSPADM

## 2022-09-07 RX ADMIN — ROPIVACAINE HYDROCHLORIDE 30 ML: 5 INJECTION, SOLUTION EPIDURAL; INFILTRATION; PERINEURAL at 12:43

## 2022-09-07 RX ADMIN — FENTANYL CITRATE 100 MCG: 50 INJECTION, SOLUTION INTRAMUSCULAR; INTRAVENOUS at 12:41

## 2022-09-07 RX ADMIN — PROPOFOL 100 MG: 10 INJECTION, EMULSION INTRAVENOUS at 13:35

## 2022-09-07 RX ADMIN — PROPOFOL 50 MG: 10 INJECTION, EMULSION INTRAVENOUS at 13:36

## 2022-09-07 RX ADMIN — ONDANSETRON HYDROCHLORIDE 4 MG: 2 INJECTION INTRAMUSCULAR; INTRAVENOUS at 13:48

## 2022-09-07 RX ADMIN — OXYCODONE 5 MG: 5 TABLET ORAL at 15:02

## 2022-09-07 RX ADMIN — ACETAMINOPHEN 1000 MG: 500 TABLET ORAL at 11:51

## 2022-09-07 RX ADMIN — DEXAMETHASONE SODIUM PHOSPHATE 4 MG: 10 INJECTION INTRAMUSCULAR; INTRAVENOUS at 12:43

## 2022-09-07 RX ADMIN — MIDAZOLAM 2 MG: 1 INJECTION INTRAMUSCULAR; INTRAVENOUS at 12:41

## 2022-09-07 RX ADMIN — DEXMEDETOMIDINE HYDROCHLORIDE 20 MCG: 100 INJECTION, SOLUTION INTRAVENOUS at 12:43

## 2022-09-07 RX ADMIN — SODIUM CHLORIDE, POTASSIUM CHLORIDE, SODIUM LACTATE AND CALCIUM CHLORIDE 125 ML/HR: 600; 310; 30; 20 INJECTION, SOLUTION INTRAVENOUS at 11:19

## 2022-09-07 RX ADMIN — LIDOCAINE HYDROCHLORIDE 80 MG: 20 INJECTION, SOLUTION INTRAVENOUS at 13:35

## 2022-09-07 NOTE — OP NOTES
TKA Manipulation    Patient: Jessica Beasley MRN: [id number]  CSN: [id number]   YOB: 1970  Age: 45 y.o. Sex: male      PREOPERATIVE DIAGNOSES: Status post right total knee arthroplasty with postoperative arthrofibrosis. POSTOPERATIVE DIAGNOSES:  Status post right total knee arthroplasty with postoperative arthrofibrosis. PROCEDURES PERFORMED:   1.  right total knee arthroplasty manipulation. 2.  right knee intraarticular cortisone injection. IMPLANTS: * No implants in log *    SURGEON:  Priscila Alba MD     FIRST ASSISTANT: Carmen Calderón PA-C    SECOND ASSISTANT: None    ASSISTANT:  None    ANESTHESIA:  General.    COMPLICATIONS:  None. ESTIMATED BLOOD LOSS:  None. FINDINGS: Same    SPECIMENS REMOVED:  None    IMPLANTS:  None    INDICATIONS:  A 45 y.o. WHITE/NON- male  status post a right TKA with failure to progress and increasing pain with stiffness. The patient is only able to achieve 85 degrees of flexion postoperatively despite therapy and aggressive exercises and pain medication. The patients full flexion at the time of the initial surgery was 130 degrees. He was also missing the last 5 degrees of extension. OPERATION:  The Patient was brought in the operating theater. After adequate anesthesia, the surgical knee was injected sterilely through an anterolateral needlestick with a mL of Depo-Medrol and 30 mL of 0.25% Marcaine with epinephrine. The knee was then manipulated in extension first and there was an actual release of adhesions posteriorly with this and I achieved full extension of the patient knee. I carefully flexed the patient all the way up to about 120 degrees of knee flexion and there was a palpable and audible lysis of adhesions. I took a picture of the knee afterwards in the full flexion and the patient then was awoken from anesthesia and returned to the recovery room awake and stable condition.   All instrument, sponge and needle counts were correct.         Jordan Randhawa MD  9/7/2022

## 2022-09-07 NOTE — ANESTHESIA PROCEDURE NOTES
Peripheral Block    Start time: 9/7/2022 12:41 PM  End time: 9/7/2022 12:45 PM  Performed by: Elli Kat DO  Authorized by: Elli Kat DO       Pre-procedure: Indications: at surgeon's request and post-op pain management    Preanesthetic Checklist: patient identified, risks and benefits discussed, site marked, timeout performed, anesthesia consent given, patient being monitored and fire risk safety assessment completed and verbalized    Timeout Time: 12:41 EDT      Block Type:   Block Type:   Adductor canal block  Laterality:  Right  Monitoring:  Standard ASA monitoring, continuous pulse ox, frequent vital sign checks, heart rate, oxygen and responsive to questions  Injection Technique:  Single shot  Procedures: ultrasound guided    Patient Position: supine (frog leg)  Location:  Mid thigh  Needle Type:  Stimuplex  Needle Gauge:  21 G  Needle Localization:  Ultrasound guidance  Med Admin Time: 9/7/2022 12:43 PM    Assessment:  Number of attempts:  1  Injection Assessment:  Incremental injection every 5 mL, local visualized surrounding nerve on ultrasound, negative aspiration for blood, no paresthesia, no intravascular symptoms and ultrasound image on chart  Patient tolerance:  Patient tolerated the procedure well with no immediate complications

## 2022-09-07 NOTE — ANESTHESIA PREPROCEDURE EVALUATION
Relevant Problems   RESPIRATORY SYSTEM   (+) Sleep apnea      CARDIOVASCULAR   (+) HTN (hypertension)      ENDOCRINE   (+) Diabetes mellitus type 2, controlled (HCC)   (+) Psoriasis with arthropathy (HCC)       Anesthetic History   No history of anesthetic complications            Review of Systems / Medical History  Patient summary reviewed, nursing notes reviewed and pertinent labs reviewed    Pulmonary    COPD    Sleep apnea: CPAP      Pertinent negatives: No asthma, recent URI and smoker  Comments: Chronic bronchitis   Neuro/Psych   Within defined limits           Cardiovascular    Hypertension: well controlled            Pertinent negatives: No past MI, CAD, PAD, dysrhythmias, angina and CHF  Exercise tolerance: >4 METS  Comments: ? MI on ekg - cardiology testing negative per patient   GI/Hepatic/Renal     GERD: well controlled      PUD  Pertinent negatives: No hepatitis, liver disease and renal disease   Endo/Other    Diabetes    Arthritis  Pertinent negatives: No hypothyroidism, hyperthyroidism, obesity and blood dyscrasia   Other Findings              Physical Exam    Airway  Mallampati: II  TM Distance: 4 - 6 cm  Neck ROM: normal range of motion   Mouth opening: Normal     Cardiovascular  Regular rate and rhythm,  S1 and S2 normal,  no murmur, click, rub, or gallop             Dental  No notable dental hx       Pulmonary  Breath sounds clear to auscultation               Abdominal  GI exam deferred       Other Findings            Anesthetic Plan    ASA: 3  Anesthesia type: general and regional - femoral single shot          Induction: Intravenous  Anesthetic plan and risks discussed with: Patient

## 2022-09-07 NOTE — ANESTHESIA POSTPROCEDURE EVALUATION
Post-Anesthesia Evaluation and Assessment    Cardiovascular Function/Vital Signs  Visit Vitals  /63   Pulse 75   Temp 36.2 °C (97.2 °F)   Resp 16   SpO2 97%       Patient is status post Procedure(s):  RIGHT KNEE MANIPULATION WITH CORTISONE INJECTION. Nausea/Vomiting: Controlled. Postoperative hydration reviewed and adequate. Pain:  Pain Scale 1: Numeric (0 - 10) (09/07/22 1404)  Pain Intensity 1: 0 (09/07/22 1404)   Managed. Neurological Status:   Neuro (WDL): Exceptions to WDL (09/07/22 1404)   At baseline. Mental Status and Level of Consciousness: Baseline and appropriate for discharge. Pulmonary Status:   O2 Device: None (Room air) (09/07/22 1431)   Adequate oxygenation and airway patent. Complications related to anesthesia: None    Post-anesthesia assessment completed. No concerns. Patient has met all discharge requirements.     Signed By: Shani Collins CRNA    September 7, 2022

## 2022-09-07 NOTE — PERIOP NOTES
aware that pt has migraine and that POC glucose 151, orders to give Tylenol 1000 mg and recheck sugar in approx 30 min

## 2022-09-07 NOTE — INTERVAL H&P NOTE
Update History & Physical    The Patient's History and Physical of September 6,   The surgery was reviewed with the patient and I examined the patient. There was no change. The surgical site was confirmed by the patient and me. Plan:  The risk, benefits, expected outcome, and alternative to the recommended procedure have been discussed with the patient. Patient understands and wants to proceed with the procedure.     Electronically signed by Yareli Casarez MD on 9/7/2022 at 11:06 AM

## 2022-09-07 NOTE — DISCHARGE INSTRUCTIONS
Jessenia Jaquez III, MD Frances Butte, PA-C    Lower Extremity Surgery  Discharge Instructions      Please take the time to review the following instructions before you leave the hospital and use them as guidelines during your recovery from surgery. If you have any questions you may contact my office at [phone]. Weight Bearing Status/Braces/Activity:    [x]   You may walk as tolerated and perform your normal daily activities. Use crutches, a walker, or a cane only if you need them. You should strive to achieve full range of motion in your knee as soon as possible. Please start using a stationary bike or walking for exercise 3 or 4 days after surgery. We would like for you to return to your normal activities as soon as possible. If you feel comfortable returning to work, you may do so at any time.    []   Weight bearing as tolerated with the knee immobilizer in place. Use crutches, cane, or walker as needed. You should sleep in your knee immobilizer. []   Non-weight bearing. Please do not bear any weight on your leg. You may use your toes for balance when walking with a cane or crutches. Ice/Elevation:    Continue ice and elevation consistently for 48 hours after surgery. When elevating your knee elevate it on about 4 pillows to be sure it is above your heart. After 48 hours, you should ice your knee 3 times per day, for 20 minutes at a time for the next 5 days. After one week from surgery, you may use ice and elevation as needed for pain and swelling. Diet:    You may advance to your regular diet as tolerated. Medication:    You will be given a prescription for pain medications when you are discharged from the hospital.  Take the medication as needed according to the directions on the prescription bottle. Possible side effects of the medication include dizziness, headache, nausea, vomiting, constipation and urinary retention.   If you experience any of these side effects call the office so that we can assist you in relieving them. Discontinue the use of the pain medication if you develop itching, rash, shortness of breath or difficulties swallowing. If these symptoms become severe or aren't relieved by discontinuing the medication you should seek immediate medical attention. Refills of pain medication are authorized during office hours only (8AM - 5PM Mon thru Fri). If you were prescribed oxycodone, Dilaudid/hydromorphone,or hydrocodon you must have a written prescription. These medications legally cannot be called in to a pharmacy. You may take over the counter Ibuprofen/Advil/Aleve between dosages of your pain medication if needed. Do not take Tylenol in addition to your pain medication as most of the pain medication already contains Tylenol. Exceptions include Dilaudid/hydromorphone, Demerol/meperidine and roxicodone. Do not exceed 3000 mg of Tylenol per day. Ex:  (hydrocodone 5/325mg = 325 mg of Tylenol)   If you have had a joint replacement you should take Xarelto 10 mg daily for 28 days from the date of your surgery. This will help to prevent blood clots from forming in your legs. You may resume the medication you were taking prior to your surgery. Pain medication may change the effects of any antidepressant medication. If you have any questions about possible interactions between your regular medications and the pain medication you should consult the physician who prescribes your regular medications. Follow Up Appointment:    If you are unsure of your follow-up appointment date and time, please call [phone]. Please let our  know you are scheduling a post-op appointment. Most appointments should be between 7-14 days after your surgery. Physical Therapy:    []   Physical therapy will be discussed with you at your first follow-up appointment with Dr. Trixie Albarran. You don't need to begin physical therapy prior to that visit.  You are to participate with Home Health Physical Therapy as arranged pre-operatively in the convience of your own home. []   Physical therapy will be discussed with you at your first follow-up appointment with Dr. Jake Sahu. You don't need to begin physical therapy prior to that visit. [x]   If you already have a therapy appointment, please be sure to attend your sessions as scheduled. If you do not have physical therapy scheduled, please call Dr. Jake Sahu' office to set up your first appointment as soon as possible.    []   You do not require Physical Therapy. Important signs and symptoms:    If any of the following signs and symptoms occurs, you should contact Dr. Jake Sahu' office. Please be advised if a problem arises which you feel required immediate medical attention or your are unable to contact Dr. Jake Sahu' office you should seek immediate medical attention. Signs and symptoms to watch for include:  A sudden increase in swelling and/or redness or warmth at the area your surgery was performed which isn't relieved by rest, ice and elevation. Oral temperature greater than 101.5 degrees for 12 hours or more which isn't relieved by an increase in fluid intake and taking two Tylenol every 4-6 hours. Do not exceed 3000 mg of Tylenol per day. Excessive drainage from your incisions or drainage that hasn't stopped by 72 hours. Calf pain, tenderness, redness or swelling which isn't relieved with rest and elevation. Fever, chills, shortness of breath, chest pain, nausea, vomiting or other signs and symptoms which are of concern to you.       DISCHARGE SUMMARY from Nurse    PATIENT INSTRUCTIONS:    After general anesthesia or intravenous sedation, for 24 hours or while taking prescription Narcotics:  Limit your activities  Do not drive and operate hazardous machinery  Do not make important personal or business decisions  Do  not drink alcoholic beverages  If you have not urinated within 8 hours after discharge, please contact your surgeon on call. Report the following to your surgeon:  Excessive pain, swelling, redness or odor of or around the surgical area  Temperature over 100.5  Nausea and vomiting lasting longer than 4 hours or if unable to take medications  Any signs of decreased circulation or nerve impairment to extremity: change in color, persistent  numbness, tingling, coldness or increase pain  Any questions    What to do at Home:  Recommended activity: Ambulate in house,     If you experience any of the following symptoms above, please follow up with Dr. Rex Sotomayor. *  Please give a list of your current medications to your Primary Care Provider. *  Please update this list whenever your medications are discontinued, doses are      changed, or new medications (including over-the-counter products) are added. *  Please carry medication information at all times in case of emergency situations. These are general instructions for a healthy lifestyle:    No smoking/ No tobacco products/ Avoid exposure to second hand smoke  Surgeon General's Warning:  Quitting smoking now greatly reduces serious risk to your health. Obesity, smoking, and sedentary lifestyle greatly increases your risk for illness    A healthy diet, regular physical exercise & weight monitoring are important for maintaining a healthy lifestyle    You may be retaining fluid if you have a history of heart failure or if you experience any of the following symptoms:  Weight gain of 3 pounds or more overnight or 5 pounds in a week, increased swelling in our hands or feet or shortness of breath while lying flat in bed. Please call your doctor as soon as you notice any of these symptoms; do not wait until your next office visit. Patient armband removed and shredded     The discharge information has been reviewed with the patient and caregiver. The patient and caregiver verbalized understanding.   Discharge medications reviewed with the patient and caregiver and appropriate educational materials and side effects teaching were provided.   ___________________________________________________________________________________________________________________________________

## 2022-09-07 NOTE — PERIOP NOTES
Reviewed PTA medication list with patient/caregiver and patient/caregiver denies any additional medications. Patient admits to having a responsible adult care for them at home for at least 24 hours after surgery. Patient encouraged to use gown warming system and informed that using said warming gown to regulate body temperature prior to a procedure has been shown to help reduce the risks of blood clots and infection. Patient's pharmacy of choice verified and documented in PTA medication section. Dual skin assessment & fall risk band verification completed with Guillermo FERRARI.

## 2022-12-12 ENCOUNTER — TRANSCRIBE ORDER (OUTPATIENT)
Dept: SCHEDULING | Age: 52
End: 2022-12-12

## 2022-12-12 DIAGNOSIS — M25.561 PAIN IN RIGHT KNEE: Primary | ICD-10-CM

## 2023-01-05 ENCOUNTER — HOSPITAL ENCOUNTER (OUTPATIENT)
Dept: LAB | Age: 53
Discharge: HOME | End: 2023-01-05

## 2023-01-05 ENCOUNTER — HOSPITAL ENCOUNTER (OUTPATIENT)
Dept: CT IMAGING | Age: 53
Discharge: HOME | End: 2023-01-05
Attending: ORTHOPAEDIC SURGERY
Payer: OTHER GOVERNMENT

## 2023-01-05 DIAGNOSIS — M25.561 PAIN IN RIGHT KNEE: ICD-10-CM

## 2023-01-05 PROCEDURE — 73700 CT LOWER EXTREMITY W/O DYE: CPT

## 2023-02-03 DIAGNOSIS — Z01.810 PREOP CARDIOVASCULAR EXAM: Primary | ICD-10-CM

## 2023-02-03 DIAGNOSIS — M25.661 LIMITATION OF JOINT MOTION OF KNEE, RIGHT: Primary | ICD-10-CM

## 2023-02-05 DIAGNOSIS — Z01.810 PREOP CARDIOVASCULAR EXAM: Primary | ICD-10-CM

## 2023-04-18 ENCOUNTER — HOSPITAL ENCOUNTER (OUTPATIENT)
Facility: HOSPITAL | Age: 53
Discharge: HOME | End: 2023-04-21

## 2023-04-18 ENCOUNTER — HOSPITAL ENCOUNTER (OUTPATIENT)
Facility: HOSPITAL | Age: 53
Discharge: HOME | End: 2023-04-21
Payer: OTHER GOVERNMENT

## 2023-04-18 DIAGNOSIS — M25.561 RIGHT KNEE PAIN, UNSPECIFIED CHRONICITY: ICD-10-CM

## 2023-04-18 PROCEDURE — 3430000000 HC RX DIAGNOSTIC RADIOPHARMACEUTICAL: Performed by: ORTHOPAEDIC SURGERY

## 2023-04-18 PROCEDURE — A9503 TC99M MEDRONATE: HCPCS | Performed by: ORTHOPAEDIC SURGERY

## 2023-04-18 RX ORDER — TC 99M MEDRONATE 20 MG/10ML
24.1 INJECTION, POWDER, LYOPHILIZED, FOR SOLUTION INTRAVENOUS
Status: COMPLETED | OUTPATIENT
Start: 2023-04-18 | End: 2023-04-18

## 2023-04-18 RX ADMIN — TC 99M MEDRONATE 24.1 MILLICURIE: 20 INJECTION, POWDER, LYOPHILIZED, FOR SOLUTION INTRAVENOUS at 11:57

## 2023-09-13 ENCOUNTER — HOSPITAL ENCOUNTER (OUTPATIENT)
Facility: HOSPITAL | Age: 53
Setting detail: SPECIMEN
Discharge: HOME | End: 2023-09-16
Payer: COMMERCIAL

## 2023-09-13 ENCOUNTER — OFFICE VISIT (OUTPATIENT)
Age: 53
End: 2023-09-13
Payer: COMMERCIAL

## 2023-09-13 VITALS
WEIGHT: 188 LBS | BODY MASS INDEX: 26.32 KG/M2 | HEART RATE: 81 BPM | SYSTOLIC BLOOD PRESSURE: 145 MMHG | TEMPERATURE: 97.2 F | HEIGHT: 71 IN | DIASTOLIC BLOOD PRESSURE: 86 MMHG | OXYGEN SATURATION: 98 %

## 2023-09-13 DIAGNOSIS — R74.8 ELEVATED LIVER ENZYMES: ICD-10-CM

## 2023-09-13 DIAGNOSIS — R74.8 ELEVATED LIVER ENZYMES: Primary | ICD-10-CM

## 2023-09-13 LAB
ALBUMIN SERPL-MCNC: 4.1 G/DL (ref 3.4–5)
ALBUMIN/GLOB SERPL: 1.5 (ref 0.8–1.7)
ALP SERPL-CCNC: 91 U/L (ref 45–117)
ALT SERPL-CCNC: 29 U/L (ref 16–61)
ANION GAP SERPL CALC-SCNC: 5 MMOL/L (ref 3–18)
AST SERPL-CCNC: 18 U/L (ref 10–38)
BASOPHILS # BLD: 0 K/UL (ref 0–0.1)
BASOPHILS NFR BLD: 0 % (ref 0–2)
BILIRUB DIRECT SERPL-MCNC: 0.1 MG/DL (ref 0–0.2)
BILIRUB SERPL-MCNC: 0.5 MG/DL (ref 0.2–1)
BUN SERPL-MCNC: 9 MG/DL (ref 7–18)
BUN/CREAT SERPL: 9 (ref 12–20)
CALCIUM SERPL-MCNC: 9.2 MG/DL (ref 8.5–10.1)
CHLORIDE SERPL-SCNC: 105 MMOL/L (ref 100–111)
CO2 SERPL-SCNC: 30 MMOL/L (ref 21–32)
CREAT SERPL-MCNC: 1.01 MG/DL (ref 0.6–1.3)
DIFFERENTIAL METHOD BLD: ABNORMAL
EOSINOPHIL # BLD: 0.1 K/UL (ref 0–0.4)
EOSINOPHIL NFR BLD: 1 % (ref 0–5)
ERYTHROCYTE [DISTWIDTH] IN BLOOD BY AUTOMATED COUNT: 13 % (ref 11.6–14.5)
FERRITIN SERPL-MCNC: 18 NG/ML (ref 8–388)
GLOBULIN SER CALC-MCNC: 2.8 G/DL (ref 2–4)
GLUCOSE SERPL-MCNC: 124 MG/DL (ref 74–99)
HCT VFR BLD AUTO: 40.1 % (ref 36–48)
HGB BLD-MCNC: 13.5 G/DL (ref 13–16)
IMM GRANULOCYTES # BLD AUTO: 0 K/UL (ref 0–0.04)
IMM GRANULOCYTES NFR BLD AUTO: 0 % (ref 0–0.5)
IRON SATN MFR SERPL: 24 % (ref 20–50)
IRON SERPL-MCNC: 88 UG/DL (ref 50–175)
LYMPHOCYTES # BLD: 0.9 K/UL (ref 0.9–3.6)
LYMPHOCYTES NFR BLD: 10 % (ref 21–52)
MCH RBC QN AUTO: 31.3 PG (ref 24–34)
MCHC RBC AUTO-ENTMCNC: 33.7 G/DL (ref 31–37)
MCV RBC AUTO: 93 FL (ref 78–100)
MONOCYTES # BLD: 0.5 K/UL (ref 0.05–1.2)
MONOCYTES NFR BLD: 5 % (ref 3–10)
NEUTS SEG # BLD: 7.9 K/UL (ref 1.8–8)
NEUTS SEG NFR BLD: 84 % (ref 40–73)
NRBC # BLD: 0 K/UL (ref 0–0.01)
NRBC BLD-RTO: 0 PER 100 WBC
PLATELET # BLD AUTO: 321 K/UL (ref 135–420)
PMV BLD AUTO: 9.2 FL (ref 9.2–11.8)
POTASSIUM SERPL-SCNC: 3.9 MMOL/L (ref 3.5–5.5)
PROT SERPL-MCNC: 6.9 G/DL (ref 6.4–8.2)
RBC # BLD AUTO: 4.31 M/UL (ref 4.35–5.65)
SODIUM SERPL-SCNC: 140 MMOL/L (ref 136–145)
TIBC SERPL-MCNC: 360 UG/DL (ref 250–450)
WBC # BLD AUTO: 9.4 K/UL (ref 4.6–13.2)

## 2023-09-13 PROCEDURE — 82103 ALPHA-1-ANTITRYPSIN TOTAL: CPT

## 2023-09-13 PROCEDURE — 87340 HEPATITIS B SURFACE AG IA: CPT

## 2023-09-13 PROCEDURE — 86015 ACTIN ANTIBODY EACH: CPT

## 2023-09-13 PROCEDURE — 85025 COMPLETE CBC W/AUTO DIFF WBC: CPT

## 2023-09-13 PROCEDURE — 83540 ASSAY OF IRON: CPT

## 2023-09-13 PROCEDURE — 80076 HEPATIC FUNCTION PANEL: CPT

## 2023-09-13 PROCEDURE — 82728 ASSAY OF FERRITIN: CPT

## 2023-09-13 PROCEDURE — 86803 HEPATITIS C AB TEST: CPT

## 2023-09-13 PROCEDURE — 36415 COLL VENOUS BLD VENIPUNCTURE: CPT

## 2023-09-13 PROCEDURE — 86704 HEP B CORE ANTIBODY TOTAL: CPT

## 2023-09-13 PROCEDURE — 86706 HEP B SURFACE ANTIBODY: CPT

## 2023-09-13 PROCEDURE — 86038 ANTINUCLEAR ANTIBODIES: CPT

## 2023-09-13 PROCEDURE — 3074F SYST BP LT 130 MM HG: CPT | Performed by: NURSE PRACTITIONER

## 2023-09-13 PROCEDURE — 99204 OFFICE O/P NEW MOD 45 MIN: CPT | Performed by: NURSE PRACTITIONER

## 2023-09-13 PROCEDURE — 86708 HEPATITIS A ANTIBODY: CPT

## 2023-09-13 PROCEDURE — 80048 BASIC METABOLIC PNL TOTAL CA: CPT

## 2023-09-13 PROCEDURE — 83550 IRON BINDING TEST: CPT

## 2023-09-13 PROCEDURE — 86037 ANCA TITER EACH ANTIBODY: CPT

## 2023-09-13 PROCEDURE — 3078F DIAST BP <80 MM HG: CPT | Performed by: NURSE PRACTITIONER

## 2023-09-13 RX ORDER — MAGNESIUM 30 MG
30 TABLET ORAL 2 TIMES DAILY
COMMUNITY

## 2023-09-13 NOTE — PROGRESS NOTES
[address] [facility] [address], FACP, [place], [place]      Elisabeth Granado, PAONOFRE Limon, PCNP-BC   Ayo Deluna, [facility]   Gris Stanton, FNP-C  Jenny Schwarz, FNP-C   Harvey Ochoa, AGPCNP-BC   Ivan Albrecht, FNP-BC      [address] [facility] [place]   at [facility]   [address] [facility], [address] [facility], [address]   [phone]   FAX: [id number]   at [facility], [address] [facility], [address]   [phone]   FAX: [phone]       Patient Care Team:  Jose Viera MD as PCP - General    Patient Active Problem List   Diagnosis    Long term (current) use of systemic steroids    Allergic rhinitis    Psoriasis    Peyronie's disease    Pelvic pain in male    Benign neoplasm of colon    Diverticulosis of colon    Acute lower GI bleeding    Tear of medial meniscus of left knee, current    Positive reaction to tuberculin skin test    Acute prostatitis    Prostate nodule    Drug-induced immunodeficiency (HCC)    IBS (irritable bowel syndrome)    Hypogonadism in male    Psoriasis with arthropathy ([address])    ILD (interstitial lung disease) ([address])    Diabetes mellitus type 2, controlled ([address])    High cholesterol    HTN (hypertension)    Sleep apnea    Primary osteoarthritis of right knee    Arthrofibrosis of total knee arthroplasty [facility])       The clinicians listed above have asked me to see Shameka Jose in consultation regarding elevated liver enzymes and its management. All medical records sent by the referring physicians were reviewed. The patient is a 52 y.o. male who was found to have elevated liver enzymes in 9/2022. Serologic evaluation for markers of chronic liver disease has either not been performed or the results are not available.       Imaging of the liver was either not performed or the results

## 2023-09-14 LAB
A1AT SERPL-MCNC: 139 MG/DL (ref 101–187)
HAV AB SER QL IA: NEGATIVE
HBV CORE AB SERPL QL IA: NEGATIVE
HBV SURFACE AG SER QL: <0.1 INDEX
HBV SURFACE AG SER QL: NEGATIVE
HCV AB SERPL QL IA: NORMAL
HCV IGG SERPL QL IA: NON REACTIVE S/CO RATIO

## 2023-09-15 LAB
C-ANCA TITR SER IF: NORMAL TITER
HBV SURFACE AB SER QL IA: POSITIVE
HBV SURFACE AB SERPL IA-ACNC: 40.86 MIU/ML
HEP BS AB COMMENT: NORMAL
P-ANCA ATYPICAL TITR SER IF: NORMAL TITER
P-ANCA TITR SER IF: NORMAL TITER
SMA IGG SER-ACNC: 3 UNITS (ref 0–19)

## 2023-09-16 LAB
ANA TITR SER IF: NEGATIVE
LABORATORY COMMENT REPORT: NORMAL

## 2024-10-28 ENCOUNTER — TELEPHONE (OUTPATIENT)
Age: 54
End: 2024-10-28

## (undated) DEVICE — NDL SPNE QNCKE 18GX3.5IN LF --

## (undated) DEVICE — SUT VCRL + 2-0 36IN CT1 UD --

## (undated) DEVICE — 3 BONE CEMENT MIXER: Brand: MIXEVAC

## (undated) DEVICE — HANDPIECE SET WITH HIGH FLOW TIP AND SUCTION TUBE: Brand: INTERPULSE

## (undated) DEVICE — PACK PROCEDURE SURG TOT KNEE CUST

## (undated) DEVICE — PIN DRL QUIK HI PERF FOR SIG SYS

## (undated) DEVICE — GLOVE SURG SZ 8 L12IN THK75MIL DK GRN LTX FREE

## (undated) DEVICE — IMMOBILIZER KNEE UNIV L19IN FOR 12-24IN THGH FOAM T BAR

## (undated) DEVICE — SYR 20ML LL STRL LF --

## (undated) DEVICE — GOWN,SIRUS,NONRNF,SETINSLV,XL,20/CS: Brand: MEDLINE

## (undated) DEVICE — NDL PRT INJ NSAF BLNT 18GX1.5 --

## (undated) DEVICE — SUT VCRL + 1 36IN CT1 VIO --

## (undated) DEVICE — GLOVE SURG SZ 75 L12IN FNGR THK79MIL GRN LTX FREE

## (undated) DEVICE — INTENDED FOR TISSUE SEPARATION, AND OTHER PROCEDURES THAT REQUIRE A SHARP SURGICAL BLADE TO PUNCTURE OR CUT.: Brand: BARD-PARKER ® CARBON RIB-BACK BLADES

## (undated) DEVICE — THE CANADY HYBRID PLASMA SCALPEL IS AN ELECTROSURGICAL PLASMA SCALPEL THAT USES AN 85MM BENDABLE PADDLE BLADE TIP. THE ELECTROSURGICAL PLASMA SCALPEL IS USED TO SIMULTANEOUSLY CUT AND COAGULATE BIOLOGICAL TISSUE.: Brand: CANADY HYBRID PLASMA PADDLE BLADE

## (undated) DEVICE — GLOVE ORANGE PI 8   MSG9080

## (undated) DEVICE — BLADE SAW W13XL90MM 1.19MM PARA

## (undated) DEVICE — SYSTEM SKIN CLSR 22CM DERMBND PRINEO

## (undated) DEVICE — GARMENT COMPR M FOR 13IN FT INTMIT SGL BLDR HEM FORC II

## (undated) DEVICE — SOL IRRIGATION INJ NACL 0.9% 500ML BTL

## (undated) DEVICE — BLADE SAW 1.19X20X90 MM FOR LG BNE

## (undated) DEVICE — PREP SKN CHLRAPRP APL 26ML STR --

## (undated) DEVICE — NEEDLE HYPO 22GA L1.5IN BLK S STL HUB POLYPR SHLD REG BVL

## (undated) DEVICE — DRSG MEPILES BORDER AG 4X12 -- 5/BX

## (undated) DEVICE — SUTURE STRATAFIX SYMMETRIC PDS + 1 SGL ARMED CT 18 IN LEN SXPP1A405

## (undated) DEVICE — SOLUTION IV 250ML 0.9% SOD CHL CLR INJ FLX BG CONT PRT CLSR

## (undated) DEVICE — COVER LT HNDL PLAS RIG 2 PER PK

## (undated) DEVICE — SOL INJ SOD CL 0.9% 500ML BG --